# Patient Record
Sex: FEMALE | Race: WHITE | HISPANIC OR LATINO | Employment: FULL TIME | URBAN - METROPOLITAN AREA
[De-identification: names, ages, dates, MRNs, and addresses within clinical notes are randomized per-mention and may not be internally consistent; named-entity substitution may affect disease eponyms.]

---

## 2017-10-26 ENCOUNTER — TRANSFERRED RECORDS (OUTPATIENT)
Dept: MULTI SPECIALTY CLINIC | Facility: CLINIC | Age: 27
End: 2017-10-26

## 2017-10-26 LAB — PAP-ABSTRACT: NORMAL

## 2017-12-03 ENCOUNTER — HEALTH MAINTENANCE LETTER (OUTPATIENT)
Age: 27
End: 2017-12-03

## 2019-09-30 NOTE — PATIENT INSTRUCTIONS
Reminders: If you are signed up for [employer] please be aware your results and communications will be sent within your mychart. Please remember to arrive 5-10 minutes early for your appointments. If you are late you may need to reschedule your appointment.      Good luck with the job interview!  We will let you know your lab results when we get them back and if abnormal, what to be done about it. Schedule your referrals. Follow up in clinic if you have any health care questions or concerns.

## 2019-10-03 ENCOUNTER — OFFICE VISIT (OUTPATIENT)
Dept: FAMILY MEDICINE | Facility: CLINIC | Age: 29
End: 2019-10-03
Payer: OTHER GOVERNMENT

## 2019-10-03 VITALS
SYSTOLIC BLOOD PRESSURE: 133 MMHG | OXYGEN SATURATION: 97 % | HEIGHT: 64 IN | BODY MASS INDEX: 32.13 KG/M2 | WEIGHT: 188.2 LBS | TEMPERATURE: 99 F | HEART RATE: 80 BPM | RESPIRATION RATE: 16 BRPM | DIASTOLIC BLOOD PRESSURE: 98 MMHG

## 2019-10-03 DIAGNOSIS — M54.9 CHRONIC BACK PAIN, UNSPECIFIED BACK LOCATION, UNSPECIFIED BACK PAIN LATERALITY: ICD-10-CM

## 2019-10-03 DIAGNOSIS — R63.5 WEIGHT GAIN: ICD-10-CM

## 2019-10-03 DIAGNOSIS — E66.811 CLASS 1 OBESITY WITHOUT SERIOUS COMORBIDITY WITH BODY MASS INDEX (BMI) OF 30.0 TO 30.9 IN ADULT, UNSPECIFIED OBESITY TYPE: ICD-10-CM

## 2019-10-03 DIAGNOSIS — M25.579 PAIN IN JOINT, ANKLE AND FOOT, UNSPECIFIED LATERALITY: ICD-10-CM

## 2019-10-03 DIAGNOSIS — L65.9 HAIR LOSS: Primary | ICD-10-CM

## 2019-10-03 DIAGNOSIS — M54.2 NECK PAIN: ICD-10-CM

## 2019-10-03 DIAGNOSIS — G89.29 CHRONIC BACK PAIN, UNSPECIFIED BACK LOCATION, UNSPECIFIED BACK PAIN LATERALITY: ICD-10-CM

## 2019-10-03 DIAGNOSIS — F43.23 SITUATIONAL MIXED ANXIETY AND DEPRESSIVE DISORDER: ICD-10-CM

## 2019-10-03 DIAGNOSIS — R21 RASH AND NONSPECIFIC SKIN ERUPTION: ICD-10-CM

## 2019-10-03 DIAGNOSIS — F41.9 ANXIETY: ICD-10-CM

## 2019-10-03 LAB — TSH SERPL DL<=0.005 MIU/L-ACNC: 2.3 MU/L (ref 0.4–4)

## 2019-10-03 PROCEDURE — 36415 COLL VENOUS BLD VENIPUNCTURE: CPT | Performed by: NURSE PRACTITIONER

## 2019-10-03 PROCEDURE — 99204 OFFICE O/P NEW MOD 45 MIN: CPT | Performed by: NURSE PRACTITIONER

## 2019-10-03 PROCEDURE — 84443 ASSAY THYROID STIM HORMONE: CPT | Performed by: NURSE PRACTITIONER

## 2019-10-03 RX ORDER — NYSTATIN AND TRIAMCINOLONE ACETONIDE 100000; 1 [USP'U]/G; MG/G
OINTMENT TOPICAL 2 TIMES DAILY
Qty: 30 G | Refills: 1 | Status: SHIPPED | OUTPATIENT
Start: 2019-10-03 | End: 2020-10-20

## 2019-10-03 RX ORDER — NORGESTIMATE AND ETHINYL ESTRADIOL 7DAYSX3 LO
1 KIT ORAL
COMMUNITY
Start: 2018-09-24 | End: 2020-04-24 | Stop reason: ALTCHOICE

## 2019-10-03 RX ORDER — LORATADINE 10 MG/1
10 TABLET ORAL EVERY 24 HOURS
COMMUNITY
Start: 2016-06-02 | End: 2019-10-03

## 2019-10-03 RX ORDER — ESCITALOPRAM OXALATE 20 MG/1
20 TABLET ORAL
COMMUNITY
Start: 2019-04-20 | End: 2020-03-13

## 2019-10-03 ASSESSMENT — ANXIETY QUESTIONNAIRES
7. FEELING AFRAID AS IF SOMETHING AWFUL MIGHT HAPPEN: SEVERAL DAYS
IF YOU CHECKED OFF ANY PROBLEMS ON THIS QUESTIONNAIRE, HOW DIFFICULT HAVE THESE PROBLEMS MADE IT FOR YOU TO DO YOUR WORK, TAKE CARE OF THINGS AT HOME, OR GET ALONG WITH OTHER PEOPLE: SOMEWHAT DIFFICULT
6. BECOMING EASILY ANNOYED OR IRRITABLE: SEVERAL DAYS
2. NOT BEING ABLE TO STOP OR CONTROL WORRYING: SEVERAL DAYS
3. WORRYING TOO MUCH ABOUT DIFFERENT THINGS: MORE THAN HALF THE DAYS
1. FEELING NERVOUS, ANXIOUS, OR ON EDGE: MORE THAN HALF THE DAYS
GAD7 TOTAL SCORE: 7
5. BEING SO RESTLESS THAT IT IS HARD TO SIT STILL: NOT AT ALL

## 2019-10-03 ASSESSMENT — PATIENT HEALTH QUESTIONNAIRE - PHQ9
SUM OF ALL RESPONSES TO PHQ QUESTIONS 1-9: 5
5. POOR APPETITE OR OVEREATING: NOT AT ALL

## 2019-10-03 ASSESSMENT — MIFFLIN-ST. JEOR: SCORE: 1579.54

## 2019-10-03 NOTE — PROGRESS NOTES
Subjective     Liudmila Collado is a 29 year old female who presents to clinic today for the following health issues:    HPI   1. Referral for counseling- recently lost her dad and dog.   lost his job. He is having gender questioning issues, and is now going to be deployed with the [employer] for 1 year.   2. Referral for physical therapy for back and ankles, neck- chronic pain. Has been seeing chiropractor but feels she may improve better with PT.   3. Referral for dermatology skin rash on chest, antifungal did not help.   4. Would like her thyroid checked-  mentioned to her that she loses a lot of hair, she has anxiety and issues with weight.       Patient Active Problem List   Diagnosis     CARDIOVASCULAR SCREENING; LDL GOAL LESS THAN 160     Encounter for other general counseling or advice on contraception     Class 1 obesity without serious comorbidity with body mass index (BMI) of 30.0 to 30.9 in adult, unspecified obesity type     Neck pain     Chronic back pain, unspecified back location, unspecified back pain laterality     Pain in joint, ankle and foot, unspecified laterality     Situational mixed anxiety and depressive disorder     Anxiety     Weight gain     Past Surgical History:   Procedure Laterality Date     BREAST BIOPSY, CORE RT/LT Right 2013    fibroadenoma     DENTAL SURGERY      wisdom teeth removed       Social History     Tobacco Use     Smoking status: Never Smoker     Smokeless tobacco: Never Used   Substance Use Topics     Alcohol use: Yes     Comment: socially     Family History   Problem Relation Age of Onset     Hypertension Father      Lipids Father      Depression Father      Aneurysm Father         observing     Diabetes Maternal Grandmother      Diabetes Maternal Aunt         x1     Diabetes Maternal Uncle         x2     Breast Cancer Paternal Aunt      Cancer - colorectal No family hx of          Current Outpatient Medications   Medication Sig Dispense Refill      "albuterol (PROAIR HFA, PROVENTIL HFA, VENTOLIN HFA) 108 (90 BASE) MCG/ACT inhaler Inhale 2 puffs into the lungs every 6 hours as needed for shortness of breath / dyspnea or wheezing 1 Inhaler 2     escitalopram (LEXAPRO) 20 MG tablet Take 20 mg by mouth       etonogestrel-ethinyl estradiol (NUVARING) 0.12-0.015 MG/24HR vaginal ring Place 1 ring every 21 days then remove for 1 week. 3 each 3     norgestim-eth estrad triphasic (ORTHO TRI-CYCLEN LO) 0.18/0.215/0.25 MG-25 MCG tablet Take 1 tablet by mouth       nystatin-triamcinolone (MYCOLOG) 382570-8.1 UNIT/GM-% external ointment Apply topically 2 times daily 30 g 1     No Known Allergies  Recent Labs   Lab Test 10/03/19  1048   TSH 2.30      BP Readings from Last 3 Encounters:   10/03/19 (!) 133/98   02/16/15 126/84   01/20/15 102/74    Wt Readings from Last 3 Encounters:   10/03/19 85.4 kg (188 lb 3.2 oz)   02/16/15 68.6 kg (151 lb 3.2 oz)   01/20/15 68.9 kg (152 lb)               Reviewed and updated as needed this visit by Provider  Tobacco  Allergies  Meds  Problems  Med Hx  Surg Hx  Fam Hx         Review of Systems   ROS COMP: Constitutional, HEENT, cardiovascular, pulmonary, GI, , musculoskeletal, neuro, skin, endocrine and psych systems are negative, except as otherwise noted.      Objective    BP (!) 133/98   Pulse 80   Temp 99  F (37.2  C) (Oral)   Resp 16   Ht 1.635 m (5' 4.37\")   Wt 85.4 kg (188 lb 3.2 oz)   LMP 09/26/2019 (Approximate)   SpO2 97%   Breastfeeding? No   BMI 31.93 kg/m    Body mass index is 31.93 kg/m .  Physical Exam   GENERAL: healthy, alert and no distress  NECK: no adenopathy, no asymmetry, masses, or scars and thyroid normal to palpation  RESP: lungs clear to auscultation - no rales, rhonchi or wheezes  CV: regular rate and rhythm, normal S1 S2, no S3 or S4, no murmur, click or rub, no peripheral edema and peripheral pulses strong  MS: no gross musculoskeletal defects noted, no edema  SKIN: POSITIVE for erythematous, " "raised rash with irregular boarders to chest- between breasts.   PSYCH: mentation appears normal, affect normal/bright    Diagnostic Test Results:  Labs reviewed in Epic  See orders        Assessment & Plan       ICD-10-CM    1. Hair loss L65.9 TSH with free T4 reflex   2. Weight gain R63.5 TSH with free T4 reflex   3. Anxiety F41.9 TSH with free T4 reflex   4. Situational mixed anxiety and depressive disorder F43.23 MENTAL HEALTH REFERRAL  - Adult; Outpatient Treatment; Individual/Couples/Family/Group Therapy/Health Psychology; G: [facility] [phone]; We will contact you to schedule the appointment or please call with any questions   5. Pain in joint, ankle and foot, unspecified laterality M25.579 PHYSICAL THERAPY REFERRAL   6. Chronic back pain, unspecified back location, unspecified back pain laterality M54.9 PHYSICAL THERAPY REFERRAL    G89.29    7. Neck pain M54.2 PHYSICAL THERAPY REFERRAL   8. Rash and nonspecific skin eruption R21 DERMATOLOGY REFERRAL     nystatin-triamcinolone (MYCOLOG) 134580-8.1 UNIT/GM-% external ointment   9. Class 1 obesity without serious comorbidity with body mass index (BMI) of 30.0 to 30.9 in adult, unspecified obesity type E66.9     Z68.30         BMI:   Estimated body mass index is 31.93 kg/m  as calculated from the following:    Height as of this encounter: 1.635 m (5' 4.37\").    Weight as of this encounter: 85.4 kg (188 lb 3.2 oz).   Weight management plan: Discussed healthy diet and exercise guidelines        See Patient Instructions: Good luck with the job interview!  We will let you know your lab results when we get them back and if abnormal, what to be done about it. Schedule your referrals. Follow up in clinic if you have any health care questions or concerns.     Return in about 4 weeks (around 10/31/2019), or if symptoms worsen or fail to improve.    Shahla Doty, JUAN  [facility] [place]      "

## 2019-10-04 PROBLEM — F43.23 SITUATIONAL MIXED ANXIETY AND DEPRESSIVE DISORDER: Status: ACTIVE | Noted: 2019-10-04

## 2019-10-04 PROBLEM — F41.9 ANXIETY: Status: ACTIVE | Noted: 2019-10-04

## 2019-10-04 PROBLEM — R63.5 WEIGHT GAIN: Status: ACTIVE | Noted: 2019-10-04

## 2019-10-04 PROBLEM — M25.579 PAIN IN JOINT, ANKLE AND FOOT, UNSPECIFIED LATERALITY: Status: ACTIVE | Noted: 2019-10-04

## 2019-10-04 PROBLEM — M54.2 NECK PAIN: Status: ACTIVE | Noted: 2019-10-04

## 2019-10-04 PROBLEM — E66.811 CLASS 1 OBESITY WITHOUT SERIOUS COMORBIDITY WITH BODY MASS INDEX (BMI) OF 30.0 TO 30.9 IN ADULT, UNSPECIFIED OBESITY TYPE: Status: ACTIVE | Noted: 2019-10-04

## 2019-10-04 PROBLEM — M54.9 CHRONIC BACK PAIN, UNSPECIFIED BACK LOCATION, UNSPECIFIED BACK PAIN LATERALITY: Status: ACTIVE | Noted: 2019-10-04

## 2019-10-04 PROBLEM — G89.29 CHRONIC BACK PAIN, UNSPECIFIED BACK LOCATION, UNSPECIFIED BACK PAIN LATERALITY: Status: ACTIVE | Noted: 2019-10-04

## 2019-10-04 ASSESSMENT — ANXIETY QUESTIONNAIRES: GAD7 TOTAL SCORE: 7

## 2019-10-04 NOTE — RESULT ENCOUNTER NOTE
Darrius Nur,    Thank you for your recent office visit.    Here are your recent results.  Normal thyroid labs.     Feel free to contact me via [employer] or call the clinic at [phone].    Sincerely,    COOPER Chew, FNP-BC

## 2020-03-02 ENCOUNTER — HEALTH MAINTENANCE LETTER (OUTPATIENT)
Age: 30
End: 2020-03-02

## 2020-04-22 ENCOUNTER — MYC MEDICAL ADVICE (OUTPATIENT)
Dept: FAMILY MEDICINE | Facility: CLINIC | Age: 30
End: 2020-04-22

## 2020-04-22 ENCOUNTER — E-VISIT (OUTPATIENT)
Dept: FAMILY MEDICINE | Facility: CLINIC | Age: 30
End: 2020-04-22
Payer: OTHER GOVERNMENT

## 2020-04-22 DIAGNOSIS — N93.9 VAGINAL BLEEDING: Primary | ICD-10-CM

## 2020-04-22 PROCEDURE — 99422 OL DIG E/M SVC 11-20 MIN: CPT | Performed by: NURSE PRACTITIONER

## 2020-04-22 NOTE — TELEPHONE ENCOUNTER
Patient should have a visit to address these concerns. [employer] message sent.    LYUBOV Hall on 4/22/2020 at 2:16 PM

## 2020-04-23 ENCOUNTER — MYC MEDICAL ADVICE (OUTPATIENT)
Dept: FAMILY MEDICINE | Facility: CLINIC | Age: 30
End: 2020-04-23

## 2020-04-23 DIAGNOSIS — N93.9 VAGINAL BLEEDING: Primary | ICD-10-CM

## 2020-04-23 NOTE — TELEPHONE ENCOUNTER
Provider E-Visit time total (minutes): unsure due to 2 providers addressing issue. Writer spent 10 min. COOPER Chew, FNP-BC

## 2020-04-23 NOTE — TELEPHONE ENCOUNTER
Unclear cause of vaginal bleeding. Will need CBC and pregnancy test. No vaginal discharge complaints, so unlikely STI, yeast, BV or TOA/PID. Anticipate OCP change for dysfunctional uterine bleeding if labs reassuring.    Please schedule lab appointment with patient for blood draw.    LYUBOV Hall on 4/23/2020 at 11:44 AM      Provider E-Visit time total (minutes): 11    Mychart E-Visit Other 1     Question  4/22/2020  4:04 PM CDT - Filed by Patient    Please describe your symptoms.  I started my period on April 7th. Since then it has continued and is still going strong. We are now on day 15. My periods normally last 4-5 days and are light to moderate. This has been heavy, and has been constant the whole time and has shown no signs of ending anytime soon. I haven't had many other symptoms no real cramping. I had body aches the day before it started but haven't had any real aches since then. I've become more fatigued, but this may be due to a new work schedule at my job (earlier days).    Have you had these symptoms before?  No    How long have you been having these symptoms? (Just today, For a few days, For a week, For one to four weeks, For more than a month)  For one to four weeks    Please list any medications you are currently taking for this condition.  I'm continuing my anxiety medication and have been taking birth control pills (although I do need a refill)    Please describe any probable cause for these symptoms.  not sure. This has never happened before that I know of    Wrap up     Anything else you would like to add?     Are you pregnant or breastfeeding?  I am confident that I am neither

## 2020-04-23 NOTE — PATIENT INSTRUCTIONS
Chrissy Nur,    Thank you for allowing [facility] to manage your care.    At this point I am unsure of the cause of your excessive bleeding. We should do some blood work to make sure you are not pregnant or anemic. We will contact you to schedule a lab visit at one of our clinic locations for a blood draw. After we get that information, we can likely prescribe you a stronger birth control pill to stop the bleeding and reset your cycle. I will reach out to you by [employer] to discuss the results.    Go to urgent care or the ER at any time if you develop bleeding in the amount of 2 pads/tampons per hour, if you get dizziness/faint, shortness of breath or other worsening/changing symptoms.    Please allow 1-2 business days for our office to call you in regards to your laboratory/radiological studies.  If not done so, I encourage you to login into [employer] ([email or web address] to review your results as well.     If you have any questions or concerns, please feel free to call us at [phone]    Sincerely,    Wes Chapman PA-C    Did you know?  You can schedule an e-Visit for certain simple non-emergent issue for your convenience.  To learn more about or start an eVisit, simply login to [employer], click  Visits  on top banner, click  Start a Virtual Visit  drop down, and click  Symptom-Specific E-Visit       Patient Education     Dysfunctional Uterine Bleeding    Dysfunctional uterine bleeding, also called abnormal uterine bleeding, is a condition in which bleeding is abnormal and occurs at unexpected times of the month. This happens because of changes in the hormones that help control a woman s menstrual cycle each month.  The bleeding may be heavier or lighter than normal. If you have heavy bleeding often, this can lead to a problem called anemia. With anemia, your red blood cell count is too low. Red blood cells help carry oxygen throughout your body. Severe anemia may cause you to look  pale and feel very weak or tired. You might also become short of breath easily.  To treat dysfunctional uterine bleeding, medicines are often tried first. If these don t help, or if you have additional symptoms or have reached menopause, further testing and treatments may be needed. Discuss all of your options with your provider.  Home care  Medicines  If you re prescribed medicines, be sure to take them as directed. Some of the more common medicines you may be prescribed include:    Hormone therapy (Options include most methods of hormonal birth control such as pills, shots, or a hormone-releasing IUD)    Nonsteroidal anti-inflammatory drugs (NSAIDs), such as ibuprofen    Iron supplements, if you have anemia     General care    Get plenty of rest if you tire easily. Avoid heavy exertion.    To help relieve pain or cramping that may occur with bleeding, try using a heating pad on the lower belly or back. A warm bath may also help.  Follow-up care  Follow up with your healthcare provider, or as directed.  When to seek medical advice  Call your healthcare provider right away if:    Bleeding becomes heavy (soaking 1 pad or tampon every hour for 3 hours)    Increased abdominal pain    Irregular bleeding worsens or does not get better even with treatment    Fever of 100.4 F (38 C) or higher, or as directed by your provider    Signs of anemia, such as pale skin, extreme fatigue or weakness, or shortness of breath    Dizziness or fainting   Date Last Reviewed: 11/1/2017 2000-2019 [employer]. [address] [place] [address], [place] [address]. All rights reserved. This information is not intended as a substitute for professional medical care. Always follow your healthcare professional's instructions.

## 2020-04-24 DIAGNOSIS — N93.9 VAGINAL BLEEDING: ICD-10-CM

## 2020-04-24 LAB
ERYTHROCYTE [DISTWIDTH] IN BLOOD BY AUTOMATED COUNT: 13.7 % (ref 10–15)
HCG SERPL QL: NEGATIVE
HCT VFR BLD AUTO: 45.1 % (ref 35–47)
HGB BLD-MCNC: 14.5 G/DL (ref 11.7–15.7)
MCH RBC QN AUTO: 29.9 PG (ref 26.5–33)
MCHC RBC AUTO-ENTMCNC: 32.2 G/DL (ref 31.5–36.5)
MCV RBC AUTO: 93 FL (ref 78–100)
PLATELET # BLD AUTO: 298 10E9/L (ref 150–450)
RBC # BLD AUTO: 4.85 10E12/L (ref 3.8–5.2)
WBC # BLD AUTO: 9.7 10E9/L (ref 4–11)

## 2020-04-24 PROCEDURE — 84703 CHORIONIC GONADOTROPIN ASSAY: CPT | Performed by: PHYSICIAN ASSISTANT

## 2020-04-24 PROCEDURE — 36415 COLL VENOUS BLD VENIPUNCTURE: CPT | Performed by: PHYSICIAN ASSISTANT

## 2020-04-24 PROCEDURE — 85027 COMPLETE CBC AUTOMATED: CPT | Performed by: PHYSICIAN ASSISTANT

## 2020-04-24 RX ORDER — NORGESTIMATE AND ETHINYL ESTRADIOL 0.25-0.035
KIT ORAL
Qty: 56 TABLET | Refills: 0 | Status: SHIPPED | OUTPATIENT
Start: 2020-04-24 | End: 2020-05-28

## 2020-04-24 NOTE — TELEPHONE ENCOUNTER
Rx for Ortho Cyclen burst for dysfunctional uterine bleeding sent to [facility].    LYUBOV Hall on 4/24/2020 at 2:46 PM

## 2020-05-03 ENCOUNTER — MYC MEDICAL ADVICE (OUTPATIENT)
Dept: FAMILY MEDICINE | Facility: CLINIC | Age: 30
End: 2020-05-03

## 2020-05-03 DIAGNOSIS — N93.9 ABNORMAL UTERINE BLEEDING: Primary | ICD-10-CM

## 2020-05-04 ENCOUNTER — MYC MEDICAL ADVICE (OUTPATIENT)
Dept: FAMILY MEDICINE | Facility: CLINIC | Age: 30
End: 2020-05-04

## 2020-05-06 ENCOUNTER — MYC MEDICAL ADVICE (OUTPATIENT)
Dept: FAMILY MEDICINE | Facility: CLINIC | Age: 30
End: 2020-05-06

## 2020-05-08 ENCOUNTER — ANCILLARY PROCEDURE (OUTPATIENT)
Dept: ULTRASOUND IMAGING | Facility: CLINIC | Age: 30
End: 2020-05-08
Attending: NURSE PRACTITIONER
Payer: OTHER GOVERNMENT

## 2020-05-08 DIAGNOSIS — N93.9 ABNORMAL UTERINE BLEEDING: ICD-10-CM

## 2020-05-08 PROCEDURE — 76830 TRANSVAGINAL US NON-OB: CPT | Performed by: RADIOLOGY

## 2020-05-08 PROCEDURE — 76856 US EXAM PELVIC COMPLETE: CPT | Mod: 59 | Performed by: RADIOLOGY

## 2020-05-08 NOTE — RESULT ENCOUNTER NOTE
Darrius Nur,    Thank you for your recent office visit.    Here are your recent results.  Your pelvic ultrasound is normal.  I would continue the birth control and see if the abnormal bleeding stops over the course of the next few months. Follow up if needed.    Feel free to contact me via [employer] or call the clinic at [phone].    Sincerely,    Shahal Doty, COOPER, FNP-BC

## 2020-05-27 DIAGNOSIS — N93.9 VAGINAL BLEEDING: ICD-10-CM

## 2020-05-27 DIAGNOSIS — F43.23 ADJUSTMENT DISORDER WITH MIXED ANXIETY AND DEPRESSED MOOD: ICD-10-CM

## 2020-05-28 RX ORDER — NORGESTIMATE AND ETHINYL ESTRADIOL 0.25-0.035
KIT ORAL
Qty: 56 TABLET | Refills: 0 | Status: SHIPPED | OUTPATIENT
Start: 2020-05-28 | End: 2020-08-20

## 2020-06-18 ENCOUNTER — MYC MEDICAL ADVICE (OUTPATIENT)
Dept: FAMILY MEDICINE | Facility: CLINIC | Age: 30
End: 2020-06-18
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
## 2020-08-24 ENCOUNTER — OFFICE VISIT (OUTPATIENT)
Dept: FAMILY MEDICINE | Facility: CLINIC | Age: 30
End: 2020-08-24
Payer: OTHER GOVERNMENT

## 2020-08-24 VITALS
HEIGHT: 64 IN | BODY MASS INDEX: 32.91 KG/M2 | TEMPERATURE: 98.2 F | WEIGHT: 192.8 LBS | RESPIRATION RATE: 16 BRPM | SYSTOLIC BLOOD PRESSURE: 124 MMHG | HEART RATE: 72 BPM | OXYGEN SATURATION: 97 % | DIASTOLIC BLOOD PRESSURE: 88 MMHG

## 2020-08-24 DIAGNOSIS — Z11.4 SCREENING FOR HUMAN IMMUNODEFICIENCY VIRUS WITHOUT PRESENCE OF RISK FACTORS: ICD-10-CM

## 2020-08-24 DIAGNOSIS — K52.29 GASTROINTESTINAL FOOD ALLERGY: Primary | ICD-10-CM

## 2020-08-24 DIAGNOSIS — N93.9 VAGINAL BLEEDING: ICD-10-CM

## 2020-08-24 DIAGNOSIS — F43.23 ADJUSTMENT DISORDER WITH MIXED ANXIETY AND DEPRESSED MOOD: ICD-10-CM

## 2020-08-24 DIAGNOSIS — Z30.09 BIRTH CONTROL COUNSELING: ICD-10-CM

## 2020-08-24 PROCEDURE — 36415 COLL VENOUS BLD VENIPUNCTURE: CPT | Performed by: NURSE PRACTITIONER

## 2020-08-24 PROCEDURE — 82785 ASSAY OF IGE: CPT | Performed by: NURSE PRACTITIONER

## 2020-08-24 PROCEDURE — 99214 OFFICE O/P EST MOD 30 MIN: CPT | Performed by: NURSE PRACTITIONER

## 2020-08-24 PROCEDURE — 87389 HIV-1 AG W/HIV-1&-2 AB AG IA: CPT | Performed by: NURSE PRACTITIONER

## 2020-08-24 PROCEDURE — 86003 ALLG SPEC IGE CRUDE XTRC EA: CPT | Performed by: NURSE PRACTITIONER

## 2020-08-24 RX ORDER — ESCITALOPRAM OXALATE 20 MG/1
20 TABLET ORAL DAILY
Qty: 90 TABLET | Refills: 1 | Status: SHIPPED | OUTPATIENT
Start: 2020-08-24 | End: 2021-04-13

## 2020-08-24 RX ORDER — NORGESTIMATE AND ETHINYL ESTRADIOL 0.25-0.035
KIT ORAL
Qty: 56 TABLET | Refills: 0 | Status: CANCELLED | OUTPATIENT
Start: 2020-08-24

## 2020-08-24 RX ORDER — NORGESTIMATE AND ETHINYL ESTRADIOL 7DAYSX3 LO
1 KIT ORAL DAILY
Qty: 90 TABLET | Refills: 3 | Status: SHIPPED | OUTPATIENT
Start: 2020-08-24 | End: 2023-06-05

## 2020-08-24 ASSESSMENT — ANXIETY QUESTIONNAIRES
2. NOT BEING ABLE TO STOP OR CONTROL WORRYING: SEVERAL DAYS
5. BEING SO RESTLESS THAT IT IS HARD TO SIT STILL: NOT AT ALL
7. FEELING AFRAID AS IF SOMETHING AWFUL MIGHT HAPPEN: SEVERAL DAYS
6. BECOMING EASILY ANNOYED OR IRRITABLE: NOT AT ALL
1. FEELING NERVOUS, ANXIOUS, OR ON EDGE: NOT AT ALL
3. WORRYING TOO MUCH ABOUT DIFFERENT THINGS: SEVERAL DAYS
IF YOU CHECKED OFF ANY PROBLEMS ON THIS QUESTIONNAIRE, HOW DIFFICULT HAVE THESE PROBLEMS MADE IT FOR YOU TO DO YOUR WORK, TAKE CARE OF THINGS AT HOME, OR GET ALONG WITH OTHER PEOPLE: SOMEWHAT DIFFICULT
GAD7 TOTAL SCORE: 4

## 2020-08-24 ASSESSMENT — PATIENT HEALTH QUESTIONNAIRE - PHQ9
5. POOR APPETITE OR OVEREATING: SEVERAL DAYS
SUM OF ALL RESPONSES TO PHQ QUESTIONS 1-9: 6

## 2020-08-24 ASSESSMENT — MIFFLIN-ST. JEOR: SCORE: 1592.29

## 2020-08-25 LAB — HIV 1+2 AB+HIV1 P24 AG SERPL QL IA: NONREACTIVE

## 2020-08-25 ASSESSMENT — ANXIETY QUESTIONNAIRES: GAD7 TOTAL SCORE: 4

## 2020-08-26 ENCOUNTER — VIRTUAL VISIT (OUTPATIENT)
Dept: DERMATOLOGY | Facility: CLINIC | Age: 30
End: 2020-08-26
Payer: OTHER GOVERNMENT

## 2020-08-26 DIAGNOSIS — L83 CONFLUENT AND RETICULATE PAPILLOMATOSIS: Primary | ICD-10-CM

## 2020-08-26 LAB
ALMOND IGE QN: <0.1 KU(A)/L
CASHEW NUT IGE QN: <0.1 KU(A)/L
CODFISH IGE QN: <0.1 KU(A)/L
COW MILK IGE QN: 0.26 KU(A)/L
EGG WHITE IGE QN: 0.13 KU(A)/L
HAZELNUT IGE QN: 1.65 KU(A)/L
IGE SERPL-ACNC: 245 KIU/L (ref 0–114)
PEANUT IGE QN: <0.1 KU(A)/L
SALMON IGE QN: <0.1 KU(A)/L
SCALLOP IGE QN: 0.1 KU(A)/L
SESAME SEED IGE QN: <0.1 KU(A)/L
SHRIMP IGE QN: 0.21 KU(A)/L
SOYBEAN IGE QN: <0.1 KU(A)/L
TUNA IGE QN: <0.1 KU(A)/L
WALNUT IGE QN: <0.1 KU(A)/L
WHEAT IGE QN: <0.1 KU(A)/L

## 2020-08-26 PROCEDURE — 99441 ZZC PHYSICIAN TELEPHONE EVALUATION 5-10 MIN: CPT | Mod: 95 | Performed by: DERMATOLOGY

## 2020-08-26 RX ORDER — MINOCYCLINE HYDROCHLORIDE 100 MG/1
100 CAPSULE ORAL 2 TIMES DAILY
Qty: 84 CAPSULE | Refills: 0 | Status: SHIPPED | OUTPATIENT
Start: 2020-08-26 | End: 2021-05-10

## 2020-08-26 NOTE — PATIENT INSTRUCTIONS
[facility] Dermatology Visit    Thank you for allowing us to participate in your care. Your findings, instructions and follow-up plan are as follows:    Diagnosis: Confluent And Reticulated Papillomatosis (CARP)  Plan:  -Minocycline 100mg twice a day until resolved or up to 6 weeks  -We don't know exactly what causes this rash, but it does tend to only affect younger people  -Some people have one episode, some people (like you!) get recurrent episodes  -It does typically respond very well to minocycline, but may come back after we stop it  -Reach out if not resolved after 6 weeks or if it comes right back after stopping the  Minocycline  -See minocycline handout below for side effect details          When should I call my doctor?    If you are worsening or not improving, please, contact us or seek urgent care as noted below.     Who should I call with questions (adults)?    [facility] (adult and pediatric): [phone]     [facility] (adult): [phone]    For urgent needs outside of business hours call the [facility] at [phone] and ask for the dermatology resident on call    If this is a medical emergency and you are unable to reach an ER, Call [phone]      Who should I call with questions (pediatric)?  [facility]- Pediatric Dermatology  Dr. Rosmery Perez, Dr. Sarthak Nolasco, Dr. Bailey Bear, Ofelia Leonard, LYUBOV Kaur, Dr. Ainsley Freed & Dr. Ed Hernandez  Non Urgent  Nurse Triage Line; [phone]- Julissa and Lyla CAMPBELL Care Coordinators   Colleen (/Complex ) [phone]    If you need a prescription refill, please contact your pharmacy. Refills are approved or denied by our Physicians during normal business hours, Monday through Fridays  Per office policy, refills will not be granted if you have not been seen within the past year (or sooner  depending on your child's condition)    Scheduling Information:  Pediatric Appointment Scheduling and Call Center [phone]  Radiology Scheduling- [phone]  Sedation Unit Scheduling- [phone]  [place] Scheduling- General [phone]; Pediatric Dermatology [phone]  Main  Services: [phone]  Nauruan: [phone]  Cook Islander: [phone]  Hmong/Jamaican/Omari: [phone]  Preadmission Nursing Department Fax Number: [phone] (Fax all pre-operative paperwork to this number)    For urgent matters arising during evenings, weekends, or holidays that cannot wait for normal business hours please call [phone] and ask for the Dermatology Resident On-Call to be paged.

## 2020-08-26 NOTE — PROGRESS NOTES
"Teledermatology Nurse Call for NEW Patients (not seen in last 3 years):     The patient was contacted by phone and we reviewed they have a visit in teledermatology upcoming with an MD or LIANE;  Importantly, \"a teledermatology visit may not be as thorough as an in-person visit, and the quality of the photograph and/or video sent may not be of the same quality as that taken by the dermatology clinic.\"     This video visit will be conducted via a call between you and your physician/provider via Geron. We have found that certain health care needs can be provided without the need for an in-person physical exam.  This service lets us provide the care you need with a video conversation.  If a prescription is necessary we can send it directly to your pharmacy.  If lab work is needed we can place an order for that and you can then stop by our lab to have the test done at a later time.If during the course of the call the physician/provider feels a video visit is not appropriate, you will not be charged for this service.Visits are billed at different rates depending on your insurance coverage. Please reach out to your insurance provider with any questions.    The patient will also send photographs via Oceen for review. Instructions for photography are/were sent to the patient via Oceen messaging.       The patient verified the location of the photo/video visit to be [place].(The physician must be notified by nurse if the patient is not in the state of [place] during the encounter)    The patient denied skin pain, fever, mucosal symptoms(lesions, blisters, sores in the mouth, nose, eyes, or genitals) no IF PATIENT ENDORSES ANY OF THESE STOP AND PAGE ON CALL ATTENDING    Additional notes:  Patient summary of issue:rash/discolaration  Location of problem on body:between breasts  How long has area/symptoms been present:had off an on for years  What makes it better?:no  What makes it worse?:no   Other symptoms include the " following:dry, not itchy, area flush with the skin and not raised  Which medications have been tried, for how long, and did they make it better or worse (ex. Triamcinolone, used twice daily for 2 weeks, not improved):Has tried antifungals, no longer working. Was prescribed nystatin-triamcinolone and did not see improvement.  The patient has not seen a dermatologist.   The patient hasno past medical history of skin cancer  ROS: The patient is generally feeling well.     Aaliyah García LPN      [facility] Dermatology Record:  Store and Forward and Telephone [phone]      Dermatology Problem List:  1. CARP, central chest  -Minocycline 100mg BID until resolved or up to 6 weeks    Encounter Date: Aug 26, 2020    CC:   Chief Complaint   Patient presents with     Derm Problem     Rash/discoloration between breasts       History of Present Illness:  Liudmila Collado is a 30 year old female who presents for rash.    In the past, antifungal creams have sporadically helped. She wonders if the rash would have went away with no treatment at all or if the antifungals really helped. Most recently tried nystatin-triamcinolone cream which did not seem to help (rx sent 10/2019). She has had similar rash on the back before, not currently.    No other skin concerns today.      ROS: Patient is generally feeling well today     Physical Examination:  General: Well-appearing female, appropriately-developed individual.  Skin: Focused examination including chest, abdomen was performed.   -Reticulated pink-brown papules on the central chest    Labs:  None    Past Medical History:   Patient Active Problem List   Diagnosis     CARDIOVASCULAR SCREENING; LDL GOAL LESS THAN 160     Encounter for other general counseling or advice on contraception     Class 1 obesity without serious comorbidity with body mass index (BMI) of 30.0 to 30.9 in adult, unspecified obesity type     Neck pain     Chronic back pain, unspecified back location,  unspecified back pain laterality     Pain in joint, ankle and foot, unspecified laterality     Situational mixed anxiety and depressive disorder     Anxiety     Weight gain     Past Medical History:   Diagnosis Date     Fibroadenoma      Seasonal rhinitis      Past Surgical History:   Procedure Laterality Date     BREAST BIOPSY, CORE RT/LT Right 2013    fibroadenoma     DENTAL SURGERY      wisdom teeth removed       Social History:  Patient reports that she has never smoked. She has never used smokeless tobacco. She reports current alcohol use. She reports that she does not use drugs. No plans for pregnancy in the near future. .    Family History:  Family History   Problem Relation Age of Onset     Anxiety Disorder Mother      Mental Illness Mother      Asthma Mother      Obesity Mother      Hypertension Father      Lipids Father      Depression Father      Aneurysm Father         observing     Anxiety Disorder Father      Obesity Father      Diabetes Maternal Grandmother      Diabetes Maternal Aunt         x1     Diabetes Maternal Uncle         x2     Breast Cancer Paternal Aunt      Depression Sister      Anxiety Disorder Sister      Asthma Sister      Cancer - colorectal No family hx of        Medications:  Current Outpatient Medications   Medication     albuterol (PROAIR HFA, PROVENTIL HFA, VENTOLIN HFA) 108 (90 BASE) MCG/ACT inhaler     escitalopram (LEXAPRO) 20 MG tablet     norgestim-eth estrad triphasic (ORTHO TRI-CYCLEN LO) 0.18/0.215/0.25 MG-25 MCG tablet     nystatin-triamcinolone (MYCOLOG) 312572-3.1 UNIT/GM-% external ointment     No current facility-administered medications for this visit.           Allergies   Allergen Reactions     Seasonal Allergies            Impression and Recommendations (Patient Counseled on the Following):  1. 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 Some  get this one time and it resolves, others have a course with more times of relapse. It does tend to go away with older age. It is typically very responsive to oral minocycline. Reviewed side effects in detail including tetragenic effects. Patient noted understanding. Will have her take 100mg BID until resolve or up to 6 weeks. RTC if not better in 6 weeks as this would be atypical. Discussed that it may flare on discontinuation of minocycline. She noted understanding.       Follow-up:   PRN     Staff only    Janet Donald MD    Department of Dermatology  [facility]: Phone: [phone], Fax:[phone]  [facility]: Phone: [phone], Fax: [phone]    _____________________________________________________________________________    Teledermatology information:  - Location of patient: [place]  - Patient presented as: provider referral  - Location of teledermatologist:  ([facility] )  - Reason teledermatology is appropriate:  of National Emergency Regarding Coronavirus disease (COVID 19) Outbreak  - Image quality and interpretability: acceptable  - Physician has received verbal consent for a Video/Photos Visit from the patient? Yes  - In-person dermatology visit recommendation: no  - Date of images: 8/26/20  - Service start time:10:58  - Service end time:11:05  - Date of report: 8/26/2020

## 2020-08-26 NOTE — LETTER
"    8/26/2020         RE: Liudmila Collado  [address]  [facility] [address]        Dear Colleague,    Thank you for referring your patient, Liudmila Collado, to the [facility] [place]. Please see a copy of my visit note below.    Teledermatology Nurse Call for NEW Patients (not seen in last 3 years):     The patient was contacted by phone and we reviewed they have a visit in teledermatology upcoming with an MD or LIANE;  Importantly, \"a teledermatology visit may not be as thorough as an in-person visit, and the quality of the photograph and/or video sent may not be of the same quality as that taken by the dermatology clinic.\"     This video visit will be conducted via a call between you and your physician/provider via [employer]. We have found that certain health care needs can be provided without the need for an in-person physical exam.  This service lets us provide the care you need with a video conversation.  If a prescription is necessary we can send it directly to your pharmacy.  If lab work is needed we can place an order for that and you can then stop by our lab to have the test done at a later time.If during the course of the call the physician/provider feels a video visit is not appropriate, you will not be charged for this service.Visits are billed at different rates depending on your insurance coverage. Please reach out to your insurance provider with any questions.    The patient will also send photographs via Acrinta for review. Instructions for photography are/were sent to the patient via Acrinta messaging.       The patient verified the location of the photo/video visit to be [place].(The physician must be notified by nurse if the patient is not in the state of [place] during the encounter)    The patient denied skin pain, fever, mucosal symptoms(lesions, blisters, sores in the mouth, nose, eyes, or genitals) no IF PATIENT ENDORSES ANY OF THESE STOP AND PAGE ON CALL ATTENDING    Additional " notes:  Patient summary of issue:rash/discolaration  Location of problem on body:between breasts  How long has area/symptoms been present:had off an on for years  What makes it better?:no  What makes it worse?:no   Other symptoms include the following:dry, not itchy, area flush with the skin and not raised  Which medications have been tried, for how long, and did they make it better or worse (ex. Triamcinolone, used twice daily for 2 weeks, not improved):Has tried antifungals, no longer working. Was prescribed nystatin-triamcinolone and did not see improvement.  The patient has not seen a dermatologist.   The patient hasno past medical history of skin cancer  ROS: The patient is generally feeling well.     Aaliyah García LPN      [facility] Dermatology Record:  Store and Forward and Telephone [phone]      Dermatology Problem List:  1. CARP, central chest  -Minocycline 100mg BID until resolved or up to 6 weeks    Encounter Date: Aug 26, 2020    CC:   Chief Complaint   Patient presents with     Derm Problem     Rash/discoloration between breasts       History of Present Illness:  Liudmila Collado is a 30 year old female who presents for rash.    In the past, antifungal creams have sporadically helped. She wonders if the rash would have went away with no treatment at all or if the antifungals really helped. Most recently tried nystatin-triamcinolone cream which did not seem to help (rx sent 10/2019). She has had similar rash on the back before, not currently.    No other skin concerns today.      ROS: Patient is generally feeling well today     Physical Examination:  General: Well-appearing female, appropriately-developed individual.  Skin: Focused examination including chest, abdomen was performed.   -Reticulated pink-brown papules on the central chest    Labs:  None    Past Medical History:   Patient Active Problem List   Diagnosis     CARDIOVASCULAR SCREENING; LDL GOAL LESS THAN 160     Encounter for other  general counseling or advice on contraception     Class 1 obesity without serious comorbidity with body mass index (BMI) of 30.0 to 30.9 in adult, unspecified obesity type     Neck pain     Chronic back pain, unspecified back location, unspecified back pain laterality     Pain in joint, ankle and foot, unspecified laterality     Situational mixed anxiety and depressive disorder     Anxiety     Weight gain     Past Medical History:   Diagnosis Date     Fibroadenoma      Seasonal rhinitis      Past Surgical History:   Procedure Laterality Date     BREAST BIOPSY, CORE RT/LT Right 2013    fibroadenoma     DENTAL SURGERY      wisdom teeth removed       Social History:  Patient reports that she has never smoked. She has never used smokeless tobacco. She reports current alcohol use. She reports that she does not use drugs. No plans for pregnancy in the near future. .    Family History:  Family History   Problem Relation Age of Onset     Anxiety Disorder Mother      Mental Illness Mother      Asthma Mother      Obesity Mother      Hypertension Father      Lipids Father      Depression Father      Aneurysm Father         observing     Anxiety Disorder Father      Obesity Father      Diabetes Maternal Grandmother      Diabetes Maternal Aunt         x1     Diabetes Maternal Uncle         x2     Breast Cancer Paternal Aunt      Depression Sister      Anxiety Disorder Sister      Asthma Sister      Cancer - colorectal No family hx of        Medications:  Current Outpatient Medications   Medication     albuterol (PROAIR HFA, PROVENTIL HFA, VENTOLIN HFA) 108 (90 BASE) MCG/ACT inhaler     escitalopram (LEXAPRO) 20 MG tablet     norgestim-eth estrad triphasic (ORTHO TRI-CYCLEN LO) 0.18/0.215/0.25 MG-25 MCG tablet     nystatin-triamcinolone (MYCOLOG) 137239-3.1 UNIT/GM-% external ointment     No current facility-administered medications for this visit.           Allergies   Allergen Reactions     Seasonal Allergies             Impression and Recommendations (Patient Counseled on the Following):  1. 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 Some get this one time and it resolves, others have a course with more times of relapse. It does tend to go away with older age. It is typically very responsive to oral minocycline. Reviewed side effects in detail including tetragenic effects. Patient noted understanding. Will have her take 100mg BID until resolve or up to 6 weeks. RTC if not better in 6 weeks as this would be atypical. Discussed that it may flare on discontinuation of minocycline. She noted understanding.       Follow-up:   PRN     Staff only    Janet Donald MD    Department of Dermatology  [facility]: Phone: [phone], Fax:[phone]  [facility]: Phone: [phone], Fax: [phone]    _____________________________________________________________________________    Teledermatology information:  - Location of patient: [place]  - Patient presented as: provider referral  - Location of teledermatologist:  ([facility] )  - Reason teledermatology is appropriate:  of National Emergency Regarding Coronavirus disease (COVID 19) Outbreak  - Image quality and interpretability: acceptable  - Physician has received verbal consent for a Video/Photos Visit from the patient? Yes  - In-person dermatology visit recommendation: no  - Date of images: 8/26/20  - Service start time:10:58  - Service end time:11:05  - Date of report: 8/26/2020     Again, thank you for allowing me to participate in the care of your patient.        Sincerely,        Janet Donald MD

## 2020-08-27 ENCOUNTER — MYC MEDICAL ADVICE (OUTPATIENT)
Dept: FAMILY MEDICINE | Facility: CLINIC | Age: 30
End: 2020-08-27

## 2020-08-31 NOTE — RESULT ENCOUNTER NOTE
Darrius Nur,    Thank you for your recent office visit.    Here are your recent results.  Labs show food sensitivities/ allergies to egg white, hazelnut, milk, scallops, shrimp    Feel free to contact me via "[employer] or call the clinic at [phone].    Sincerely,    COOPER Chew, FNP-BC

## 2020-10-19 ASSESSMENT — ENCOUNTER SYMPTOMS
BREAST MASS: 0
MYALGIAS: 0
HEARTBURN: 0
FEVER: 0
NAUSEA: 1
PARESTHESIAS: 0
HEADACHES: 1
FREQUENCY: 0
NERVOUS/ANXIOUS: 1
HEMATOCHEZIA: 0
ARTHRALGIAS: 0
WEAKNESS: 0
SHORTNESS OF BREATH: 0
HEMATURIA: 0
CONSTIPATION: 0
ABDOMINAL PAIN: 0
DIARRHEA: 0
SORE THROAT: 0
CHILLS: 0
DIZZINESS: 0
COUGH: 0
EYE PAIN: 0
PALPITATIONS: 0
DYSURIA: 0
JOINT SWELLING: 0

## 2020-10-20 ENCOUNTER — OFFICE VISIT (OUTPATIENT)
Dept: FAMILY MEDICINE | Facility: CLINIC | Age: 30
End: 2020-10-20
Payer: OTHER GOVERNMENT

## 2020-10-20 VITALS
WEIGHT: 194 LBS | HEIGHT: 65 IN | SYSTOLIC BLOOD PRESSURE: 143 MMHG | TEMPERATURE: 99.2 F | OXYGEN SATURATION: 96 % | HEART RATE: 128 BPM | RESPIRATION RATE: 16 BRPM | DIASTOLIC BLOOD PRESSURE: 86 MMHG | BODY MASS INDEX: 32.32 KG/M2

## 2020-10-20 DIAGNOSIS — Z13.1 SCREENING FOR DIABETES MELLITUS: ICD-10-CM

## 2020-10-20 DIAGNOSIS — Z13.220 LIPID SCREENING: ICD-10-CM

## 2020-10-20 DIAGNOSIS — R11.0 NAUSEA: ICD-10-CM

## 2020-10-20 DIAGNOSIS — Z00.00 ROUTINE GENERAL MEDICAL EXAMINATION AT A HEALTH CARE FACILITY: Primary | ICD-10-CM

## 2020-10-20 DIAGNOSIS — S93.402S SPRAIN OF LEFT ANKLE, UNSPECIFIED LIGAMENT, SEQUELA: ICD-10-CM

## 2020-10-20 DIAGNOSIS — Z13.29 SCREENING FOR THYROID DISORDER: ICD-10-CM

## 2020-10-20 DIAGNOSIS — Z23 NEEDS FLU SHOT: ICD-10-CM

## 2020-10-20 DIAGNOSIS — Z12.4 SCREENING FOR MALIGNANT NEOPLASM OF CERVIX: ICD-10-CM

## 2020-10-20 LAB
CHOLEST SERPL-MCNC: 269 MG/DL
GLUCOSE SERPL-MCNC: 96 MG/DL (ref 70–99)
HDLC SERPL-MCNC: 55 MG/DL
LDLC SERPL CALC-MCNC: 154 MG/DL
NONHDLC SERPL-MCNC: 214 MG/DL
TRIGL SERPL-MCNC: 300 MG/DL
TSH SERPL DL<=0.005 MIU/L-ACNC: 0.48 MU/L (ref 0.4–4)

## 2020-10-20 PROCEDURE — 80061 LIPID PANEL: CPT | Performed by: NURSE PRACTITIONER

## 2020-10-20 PROCEDURE — 82947 ASSAY GLUCOSE BLOOD QUANT: CPT | Performed by: NURSE PRACTITIONER

## 2020-10-20 PROCEDURE — 90682 RIV4 VACC RECOMBINANT DNA IM: CPT | Performed by: NURSE PRACTITIONER

## 2020-10-20 PROCEDURE — 99213 OFFICE O/P EST LOW 20 MIN: CPT | Mod: 25 | Performed by: NURSE PRACTITIONER

## 2020-10-20 PROCEDURE — 90471 IMMUNIZATION ADMIN: CPT | Performed by: NURSE PRACTITIONER

## 2020-10-20 PROCEDURE — 99395 PREV VISIT EST AGE 18-39: CPT | Mod: 25 | Performed by: NURSE PRACTITIONER

## 2020-10-20 PROCEDURE — G0145 SCR C/V CYTO,THINLAYER,RESCR: HCPCS | Performed by: NURSE PRACTITIONER

## 2020-10-20 PROCEDURE — 36415 COLL VENOUS BLD VENIPUNCTURE: CPT | Performed by: NURSE PRACTITIONER

## 2020-10-20 PROCEDURE — 84443 ASSAY THYROID STIM HORMONE: CPT | Performed by: NURSE PRACTITIONER

## 2020-10-20 RX ORDER — ONDANSETRON 4 MG/1
4-8 TABLET, FILM COATED ORAL EVERY 8 HOURS PRN
Qty: 12 TABLET | Refills: 0 | Status: SHIPPED | OUTPATIENT
Start: 2020-10-20 | End: 2021-05-10

## 2020-10-20 ASSESSMENT — MIFFLIN-ST. JEOR: SCORE: 1603.98

## 2020-10-20 NOTE — PROGRESS NOTES
"   SUBJECTIVE:   CC: Liudmila Collado is an 30 year old woman who presents for preventive health visit.     {Split Bill scripting  The purpose of this visit is to discuss your medical history and prevent health problems before you are sick. You may be responsible for a co-pay, coinsurance, or deductible if your visit today includes services such as checking on a sore throat, having an x-ray or lab test, or treating and evaluating a new or existing condition :143179}  Patient has been advised of split billing requirements and indicates understanding: {Yes and No:956334}  Healthy Habits:    Do you get at least three servings of calcium containing foods daily (dairy, green leafy vegetables, etc.)? { :021220::\"yes\"}    Amount of exercise or daily activities, outside of work: { :456508}    Problems taking medications regularly { :487777::\"No\"}    Medication side effects: { :394875::\"No\"}    Have you had an eye exam in the past two years? { :238432}    Do you see a dentist twice per year? { :066782}    Do you have sleep apnea, excessive snoring or daytime drowsiness?{ :265073}  {Outside tests to abstract? :561422}    {additional problems to add (Optional):841484}    Today's PHQ-2 Score:   PHQ-2 ( 1999 [employer]) 10/19/2020 10/15/2020   Q1: Little interest or pleasure in doing things 1 0   Q2: Feeling down, depressed or hopeless 1 1   PHQ-2 Score 2 1   Q1: Little interest or pleasure in doing things Several days Not at all   Q2: Feeling down, depressed or hopeless Several days Several days   PHQ-2 Score 2 1     {PHQ-2 LOOK IN ASSESSMENTS (Optional) :413832}  Abuse: Current or Past(Physical, Sexual or Emotional)- {YES/NO/NA:075050}  Do you feel safe in your environment? {YES/NO/NA:667246}        Social History     Tobacco Use     Smoking status: Never Smoker     Smokeless tobacco: Never Used   Substance Use Topics     Alcohol use: Yes     Comment: socially     If you drink alcohol do you typically have >3 drinks per day or >7 " "drinks per week? {ETOH :298345}                     Reviewed orders with patient.  Reviewed health maintenance and updated orders accordingly - {Yes/No:499605::\"Yes\"}  {Chronicprobdata (Optional):567371}    {Mammo Decision Support (Optional):270006}    Pertinent mammograms are reviewed under the imaging tab.  History of abnormal Pap smear: {PAP HX:485019}  PAP / HPV 6/11/2013   PAP NIL     Reviewed and updated as needed this visit by clinical staff                 Reviewed and updated as needed this visit by Provider                {HISTORY OPTIONS (Optional):062587}    ROS:  { :280638}    OBJECTIVE:   There were no vitals taken for this visit.  EXAM:  {Exam Choices:910016}    {Diagnostic Test Results (Optional):382264::\"Diagnostic Test Results:\",\"Labs reviewed in Epic\"}    ASSESSMENT/PLAN:   {Diag Picklist:551042}    Patient has been advised of split billing requirements and indicates understanding: {YES / NO:232315::\"Yes\"}  COUNSELING:   {FEMALE COUNSELING MESSAGES:435999::\"Reviewed preventive health counseling, as reflected in patient instructions\"}    Estimated body mass index is 32.92 kg/m  as calculated from the following:    Height as of 8/24/20: 1.63 m (5' 4.17\").    Weight as of 8/24/20: 87.5 kg (192 lb 12.8 oz).    {Weight Management Plan (ACO) Complete if BMI is abnormal-  Ages 18-64  BMI >24.9.  Age 65+ with BMI <23 or >30 (Optional):937512}    She reports that she has never smoked. She has never used smokeless tobacco.      Counseling Resources:  ATP IV Guidelines  Pooled Cohorts Equation Calculator  Breast Cancer Risk Calculator  BRCA-Related Cancer Risk Assessment: FHS-7 Tool  FRAX Risk Assessment  ICSI Preventive Guidelines  Dietary Guidelines for Americans, 2010  USDA's MyPlate  ASA Prophylaxis  Lung CA Screening    Shahla Doty NP  [facility]  "
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
## 2020-10-20 NOTE — PATIENT INSTRUCTIONS
Preventive Health Recommendations  Female Ages 26 - 39  Yearly exam:   See your health care provider every year in order to    Review health changes.     Discuss preventive care.      Review your medicines if you your doctor has prescribed any.    Until age 30: Get a Pap test every three years (more often if you have had an abnormal result).    After age 30: Talk to your doctor about whether you should have a Pap test every 3 years or have a Pap test with HPV screening every 5 years.   You do not need a Pap test if your uterus was removed (hysterectomy) and you have not had cancer.  You should be tested each year for STDs (sexually transmitted diseases), if you're at risk.   Talk to your provider about how often to have your cholesterol checked.  If you are at risk for diabetes, you should have a diabetes test (fasting glucose).  Shots: Get a flu shot each year. Get a tetanus shot every 10 years.   Nutrition:     Eat at least 5 servings of fruits and vegetables each day.    Eat whole-grain bread, whole-wheat pasta and brown rice instead of white grains and rice.    Get adequate Calcium and Vitamin D.     Lifestyle    Exercise at least 150 minutes a week (30 minutes a day, 5 days of the week). This will help you control your weight and prevent disease.    Limit alcohol to one drink per day.    No smoking.     Wear sunscreen to prevent skin cancer.    See your dentist every six months for an exam and cleaning.    Patient Education     Monthly Mole Check Chart  Anyone can get skin cancer. It doesn t matter what your skin color is. Doing a monthly skin check is an important way to spot early signs of melanoma. Melanoma is the deadliest type of skin cancer. But if it s found early, it can be treated. Regular skin checks can help you track any changes in your skin.  Getting started  Each month, check your body for any spots such as freckles, age spots, and moles. Use this sheet to help you by doing the  followin. Number each spot you find on the images below.  2. Record the details for each spot, along with the date, on the chart at the bottom of the page.  3. Keep all of your completed charts. This will help you track any changes in your skin over time.  What to look for  When doing a skin check, be sure to use the ABCDEs of melanoma. This means checking spots and moles for the following:      Asymmetry. One half of the mole is not like the other half.    Border. The edges are not smooth but ragged, notched, or blurred.    Color. Color varies from 1 part of the mole to another and may be tan, brown, or black. In some cases the color can be white, red, or blue.    Diameter. The mole is larger than 6 mm (size of a pencil eraser).    Evolving. The mole is getting larger or changing its shape or color.  How to check  Stand before a full-length mirror and check all parts of your body. For spots on your back or other areas you can't see, have a family member or friend do this for you. Or you can also use a hand mirror to check hard-to-see areas such as your back, buttocks, back of the neck, and scalp. To get a better look when checking your scalp, part your hair.  When to call your healthcare provider  Call your healthcare provider if any of your moles:    Hurt    Itch    Ooze    Bleed    Thicken    Become crusty    Show any of the ABCDEs of melanoma  Monthly Skin Check Chart  Date       Mole #    Asymmetry:  What is the mole's shape? Border of mole Color of mole Diameter of mole Evolving: How has mole changed?                                                                                                   Date Last Reviewed: 3/1/2017    2726-1818 [employer]. [address] [place] [address]. All rights reserved. This information is not intended as a substitute for professional medical care. Always follow your healthcare professional's instructions.

## 2020-10-22 LAB
COPATH REPORT: NORMAL
PAP: NORMAL

## 2020-10-22 NOTE — RESULT ENCOUNTER NOTE
Darrius Nur,    Thank you for your recent office visit.    Here are your recent results.  Your labs show your cholesterol is slightly elevated, consider taking a daily fish oils supplement.  Diet and exercise can also help.  We can recheck fasting labs in one year.     Feel free to contact me via [employer] or call the clinic at [phone].    Sincerely,    COOPER Chew, FNP-BC

## 2021-01-05 DIAGNOSIS — N93.9 VAGINAL BLEEDING: ICD-10-CM

## 2021-01-07 RX ORDER — NORGESTIMATE AND ETHINYL ESTRADIOL 0.25-0.035
KIT ORAL
Qty: 56 TABLET | Refills: 0 | OUTPATIENT
Start: 2021-01-07

## 2021-02-05 ENCOUNTER — ANCILLARY PROCEDURE (OUTPATIENT)
Dept: GENERAL RADIOLOGY | Facility: CLINIC | Age: 31
End: 2021-02-05
Attending: NURSE PRACTITIONER
Payer: OTHER GOVERNMENT

## 2021-02-05 ENCOUNTER — OFFICE VISIT (OUTPATIENT)
Dept: FAMILY MEDICINE | Facility: CLINIC | Age: 31
End: 2021-02-05
Payer: OTHER GOVERNMENT

## 2021-02-05 VITALS
HEIGHT: 65 IN | TEMPERATURE: 99.4 F | HEART RATE: 61 BPM | BODY MASS INDEX: 29.99 KG/M2 | SYSTOLIC BLOOD PRESSURE: 111 MMHG | RESPIRATION RATE: 16 BRPM | OXYGEN SATURATION: 97 % | DIASTOLIC BLOOD PRESSURE: 72 MMHG | WEIGHT: 180 LBS

## 2021-02-05 DIAGNOSIS — E66.811 CLASS 1 OBESITY WITHOUT SERIOUS COMORBIDITY WITH BODY MASS INDEX (BMI) OF 30.0 TO 30.9 IN ADULT, UNSPECIFIED OBESITY TYPE: ICD-10-CM

## 2021-02-05 DIAGNOSIS — S93.401A SPRAIN OF RIGHT ANKLE, UNSPECIFIED LIGAMENT, INITIAL ENCOUNTER: ICD-10-CM

## 2021-02-05 DIAGNOSIS — S93.401A SPRAIN OF RIGHT ANKLE, UNSPECIFIED LIGAMENT, INITIAL ENCOUNTER: Primary | ICD-10-CM

## 2021-02-05 PROCEDURE — 99213 OFFICE O/P EST LOW 20 MIN: CPT | Performed by: NURSE PRACTITIONER

## 2021-02-05 PROCEDURE — 73610 X-RAY EXAM OF ANKLE: CPT | Mod: RT | Performed by: RADIOLOGY

## 2021-02-05 ASSESSMENT — PAIN SCALES - GENERAL: PAINLEVEL: WORST PAIN (10)

## 2021-02-05 ASSESSMENT — MIFFLIN-ST. JEOR: SCORE: 1540.52

## 2021-02-05 NOTE — PATIENT INSTRUCTIONS
At [facility], we strive to deliver an exceptional experience to you, every time we see you. If you receive a survey, please complete it as we do value your feedback.  If you have MyChart, you can expect to receive results automatically within 24 hours of their completion.  Your provider will send a note interpreting your results as well.   If you do not have MyChart, you should receive your results in about a week by mail.    Your care team:                            Family Medicine Internal Medicine   MD Jorge Quintana MD Shantel Branch-Fleming, MD Srinivasa Vaka, MD Katya Belousova, PAIRIS Ruiz, APRN CNP    Vaibhav Apple, MD Pediatrics   Tomas Yuen, PASandrineC  Eliza Almaguer, CNP MD Rani Kennedy APRN CNP   MD Anne Ortiz MD Deborah Mielke, MD Ivelisse Jacobson, APRN [facility]      Clinic hours: Monday - Thursday 7 am-6 pm; Fridays 7 am-5 pm.   Urgent care: Monday - Friday 11 am-9 pm; Saturday and Sunday 9 am-5 pm.    Clinic: [phone]       [facility]: Monday - Thursday 8 am - 7 pm; Friday 8 am - 6 pm  [facility]: [phone]     Use www.[email or web address].org for 24/7 diagnosis and treatment of dozens of conditions.    Patient Education     Treating Ankle Sprains  Treatment will depend on how bad your sprain is. For a severe sprain, healing may take 3 months or more.  Right after your injury: Use R.I.C.E.    BIG: Rest: At first, keep weight off the ankle as much as you can. You may be given crutches to help you walk without putting weight on the ankle.    BIG: Ice: Put an ice pack on the ankle for 20 minutes. Remove the pack and wait at least 30 minutes. Repeat for up to 3 days. This helps reduce swelling.    BIG: Compression: To reduce swelling and keep the joint stable, you may need to wrap the ankle with an elastic bandage. For more severe sprains, you may need an ankle  brace, a boot, or a cast.    BIG: Elevation: To reduce swelling, keep your ankle raised above your heart when you sit or lie down.  Medicine  Your healthcare provider may suggest oral nonsteroidal anti-inflammatory medicine (NSAIDs), such as ibuprofen. This relieves the pain and helps reduce swelling. Be sure to take your medicine as directed.  Exercises    After about 2 to 3 weeks, you may be given exercises to strengthen the ligaments and muscles in the ankle. Doing these exercises will help prevent another ankle sprain. Exercises may include standing on your toes and then on your heels and doing ankle curls.    Sit on the edge of a sturdy table or lie on your back.    Pull your toes toward you. Then point them away from you. Repeat for 2 to 3 minutes.  [employer] last reviewed this educational content on 1/1/2018 2000-2020 [employer]). [address] [place] [address]. All rights reserved. This information is not intended as a substitute for professional medical care. Always follow your healthcare professional's instructions.
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
## 2021-03-23 DIAGNOSIS — M54.50 BILATERAL LOW BACK PAIN WITHOUT SCIATICA, UNSPECIFIED CHRONICITY: Primary | ICD-10-CM

## 2021-03-23 DIAGNOSIS — M25.571 PAIN IN JOINT INVOLVING ANKLE AND FOOT, RIGHT: ICD-10-CM

## 2021-03-23 DIAGNOSIS — S93.401S SPRAIN OF RIGHT ANKLE, UNSPECIFIED LIGAMENT, SEQUELA: ICD-10-CM

## 2021-03-26 ENCOUNTER — THERAPY VISIT (OUTPATIENT)
Dept: PHYSICAL THERAPY | Facility: CLINIC | Age: 31
End: 2021-03-26
Attending: NURSE PRACTITIONER
Payer: OTHER GOVERNMENT

## 2021-03-26 DIAGNOSIS — S93.401S SPRAIN OF RIGHT ANKLE, UNSPECIFIED LIGAMENT, SEQUELA: ICD-10-CM

## 2021-03-26 DIAGNOSIS — M54.50 BILATERAL LOW BACK PAIN WITHOUT SCIATICA, UNSPECIFIED CHRONICITY: ICD-10-CM

## 2021-03-26 DIAGNOSIS — M25.571 PAIN IN JOINT INVOLVING ANKLE AND FOOT, RIGHT: ICD-10-CM

## 2021-03-26 DIAGNOSIS — M54.2 NECK PAIN: ICD-10-CM

## 2021-03-26 DIAGNOSIS — M54.6 BILATERAL THORACIC BACK PAIN, UNSPECIFIED CHRONICITY: Primary | ICD-10-CM

## 2021-03-26 PROCEDURE — 97161 PT EVAL LOW COMPLEX 20 MIN: CPT | Mod: GP | Performed by: PHYSICAL THERAPIST

## 2021-03-26 PROCEDURE — 97112 NEUROMUSCULAR REEDUCATION: CPT | Mod: GP | Performed by: PHYSICAL THERAPIST

## 2021-03-26 PROCEDURE — 97110 THERAPEUTIC EXERCISES: CPT | Mod: GP | Performed by: PHYSICAL THERAPIST

## 2021-03-26 NOTE — PROGRESS NOTES
Physical Therapy Initial Evaluation  Subjective:    Patient Health History  Liudmila Collado being seen for Ankle sprain and back and neck pain..     Problem began: 2/4/2021.   Problem occurred: Fell on ice.   Pain is reported as 5/10 on pain scale.  General health as reported by patient is fair.  Pertinent medical history includes: depression, migraines/headaches, numbness/tingling and overweight.   Red flags:  None as reported by patient.  Medical allergies: none.   Surgeries include:  Other. Other surgery history details: Oral and benign mass removed from breast..    Current medications:  Anti-depressants. Other medications details: Antihistamine..    Current occupation is ..   Primary job tasks include:  Computer work, prolonged standing and repetitive tasks.                  Therapist Generated HPI Evaluation  Problem details: Pt presents to clinic with complaints of lateral R ankle pain. Pt reports slipping on ice on 2/4/2021 and twisted ankle. Pt reports having long history of bilateral ankle instability. Pt had X-ray, negative for fracture. Pt was given crutches and ankle brace. Pt has continued to use ankle brace for mobility. Pt had MD appointment on 2/4/2021. .         Type of problem:  Right ankle.    This is a new condition.  Condition occurred with:  A fall/slip.  Where condition occurred: in the community.  Patient reports pain:  Lateral.  Pain is described as sharp and is intermittent.  Pain radiates to:  No radiation. Pain is worse in the A.M..  Since onset symptoms are gradually improving.  Associated symptoms:  Buckling/giving out and loss of motion/stiffness. Symptoms are exacerbated by ascending stairs, activity, standing, walking, weight bearing and descending stairs  and relieved by bracing/immobilizing, NSAID's and ice.  Special tests included:  X-ray.  Past treatment: none. There was none improvement following previous treatment.  Restrictions due to condition include:  Working  in normal job without restrictions.  Barriers include:  None as reported by patient.    Therapist Generated HPI Evaluation  Problem details: Pt reports >5 year history of R sided low back and L sided mid-upper back pain for unknown reasons. Pt having most difficulty with prolonged sitting.         Type of problem:  Lumbar.    This is a chronic condition.  Condition occurred with:  Insidious onset.  Where condition occurred: for unknown reasons.  Patient reports pain:  Lower lumbar spine and lumbar spine right.  Pain is described as aching and sharp and is intermittent.  Pain radiates to:  No radiation. Pain is worse in the P.M..  Since onset symptoms are unchanged.  Associated symptoms:  Loss of motion/stiffness, numbness and tingling. Symptoms are exacerbated by sitting, lifting and standing  and relieved by activity/movement.  Imaging testing: none.  Previous treatment includes chiropractic. There was mild improvement following previous treatment.  Restrictions due to condition include:  Working in normal job without restrictions.  Barriers include:  None as reported by patient.                        Objective:  System    Ankle/Foot Evaluation  ROM:    AROM:    Dorsiflexion:  Left:   15  Right:   10  Plantarflexion:  Left:  60    Right:  60  Inversion:  Left:  WNL     Right:  WNL  Eversion:  WNL     Right:  WNL              PALPATION:     Right ankle tenderness present at:   anterior talofibular ligament; posterior talofibular ligament and calcaneofibular ligament  EDEMA:   Left ankle edema present at: 52 cm  Right ankle edema present at:  53 cm      Figure 8 left: 52 cmFigure 8 right: 53 cm    FUNCTIONAL TESTS:         Quad:      Bilateral Leg Squat:  Control is moderate loss of control    Proprioception:  Stork Balance Test: Left: EO x 30 sec  Right: EO x 5 sec        Lumbar/SI Evaluation  ROM:    AROM Lumbar:   Flexion:            To floor -pain  Ext:                    WNL slight pain   Side Bend:         Left:  WNL    Right:  WNL  Rotation:           Left:  75%    Right:  75%  Side Glide:        Left:     Right:         Strength: Glute med 4-/5 R, 4/5 L, glute max 4/5 R, 4/5 L  Lumbar Myotomes:  normal                  Neural Tension/Mobility:      Left side:SLR; Femoral Nerve or Slump  negative.     Right side:   Femoral Nerve; Slump or SLR  negative.   Lumbar Palpation:    Tenderness present at Left:    Erector Spinae  Tenderness present at Right: Erector Spinae                                                     General     ROS    Assessment/Plan:    Patient is a 30 year old female with lumbar and right side ankle complaints.    Patient has the following significant findings with corresponding treatment plan.                Diagnosis 1:  R ankle sprain  Pain -  hot/cold therapy, manual therapy, self management, education and home program  Decreased ROM/flexibility - manual therapy, therapeutic exercise, therapeutic activity and home program  Decreased strength - therapeutic exercise, therapeutic activities and home program  Impaired balance - neuro re-education, therapeutic activities and home program  Impaired muscle performance - neuro re-education and home program  Decreased function - therapeutic activities and home program  Diagnosis 2:  Low back pain   Pain -  hot/cold therapy, manual therapy, self management, education and home program  Decreased ROM/flexibility - manual therapy, therapeutic exercise, therapeutic activity and home program  Decreased strength - therapeutic exercise, therapeutic activities and home program  Impaired muscle performance - neuro re-education and home program  Decreased function - therapeutic activities and home program  Impaired posture - neuro re-education, therapeutic activities and home program    Therapy Evaluation Codes:   1) History comprised of:   Personal factors that impact the plan of care:      None.    Comorbidity factors that impact the plan of care are:       Depression, Migraines/headaches and Overweight.     Medications impacting care: Anti-depressant.  2) Examination of Body Systems comprised of:   Body structures and functions that impact the plan of care:      Ankle and Lumbar spine.   Activity limitations that impact the plan of care are:      Bending, Lifting, Squatting/kneeling, Standing and Walking.  3) Clinical presentation characteristics are:   Stable/Uncomplicated.  4) Decision-Making    Low complexity using standardized patient assessment instrument and/or measureable assessment of functional outcome.  Cumulative Therapy Evaluation is: Low complexity.    Previous and current functional limitations:  (See Goal Flow Sheet for this information)    Short term and Long term goals: (See Goal Flow Sheet for this information)     Communication ability:  Patient appears to be able to clearly communicate and understand verbal and written communication and follow directions correctly.  Treatment Explanation - The following has been discussed with the patient:   RX ordered/plan of care  Anticipated outcomes  Possible risks and side effects  This patient would benefit from PT intervention to resume normal activities.   Rehab potential is good.    Frequency:  1 X week, once daily  Duration:  for 8 weeks  Discharge Plan:  Achieve all LTG.  Independent in home treatment program.  Return to previous functional level by discharge.  Reach maximal therapeutic benefit.    Please refer to the daily flowsheet for treatment today, total treatment time and time spent performing 1:1 timed codes.

## 2021-04-02 ENCOUNTER — THERAPY VISIT (OUTPATIENT)
Dept: PHYSICAL THERAPY | Facility: CLINIC | Age: 31
End: 2021-04-02
Attending: NURSE PRACTITIONER
Payer: OTHER GOVERNMENT

## 2021-04-02 DIAGNOSIS — M54.6 BILATERAL THORACIC BACK PAIN, UNSPECIFIED CHRONICITY: ICD-10-CM

## 2021-04-02 DIAGNOSIS — S93.401S SPRAIN OF RIGHT ANKLE, UNSPECIFIED LIGAMENT, SEQUELA: ICD-10-CM

## 2021-04-02 DIAGNOSIS — M54.2 NECK PAIN: ICD-10-CM

## 2021-04-02 DIAGNOSIS — M54.50 BILATERAL LOW BACK PAIN WITHOUT SCIATICA, UNSPECIFIED CHRONICITY: Primary | ICD-10-CM

## 2021-04-02 PROCEDURE — 97110 THERAPEUTIC EXERCISES: CPT | Mod: GP | Performed by: PHYSICAL THERAPIST

## 2021-04-02 PROCEDURE — 97112 NEUROMUSCULAR REEDUCATION: CPT | Mod: GP | Performed by: PHYSICAL THERAPIST

## 2021-04-02 PROCEDURE — 97140 MANUAL THERAPY 1/> REGIONS: CPT | Mod: GP | Performed by: PHYSICAL THERAPIST

## 2021-05-07 NOTE — PROGRESS NOTES
Discharge Note    Progress reporting period is from initial evaluation date (please see noted date below) to Apr 2, 2021.  Linked Episodes   Type: Episode: Status: Noted: Resolved: Last update: Updated by:   PHYSICAL THERAPY Low back pain, R ankle 3/26/2021 Active 3/26/2021  4/2/2021  1:50 PM Jono Leung, PT      Comments:       Liudmila failed to follow up and current status is unknown.  Please see information below for last relevant information on current status.  Patient seen for 2 visits.    SUBJECTIVE  Subjective changes noted by patient:  Has stopped using brace for ankle without difficulty. Noticing slight increase in upper back/neck pain after prolonged driving this week.   .  Current pain level is 6/10(ankle 2/10 pain, upper back 6/10).     Previous pain level was  6/10.   Changes in function:  Yes (See Goal flowsheet attached for changes in current functional level)  Adverse reaction to treatment or activity: None    OBJECTIVE  Changes noted in objective findings: Cervical AROM: extension 75% slight pulling L, flexion WNL -pain, L rotation 60 deg +pain L, R rotation 70 deg +pain L, L SB 20 deg +pain L, R SB 25 deg     ASSESSMENT/PLAN  Diagnosis: R ankle sprain   Updated problem list and treatment plan:   Pain - HEP  Decreased ROM/flexibility - HEP  Decreased strength - HEP  Impaired muscle performance - HEP  Impaired posture - HEP  STG/LTGs have been met or progress has been made towards goals:  Yes, please see goal flowsheet for most current information  Assessment of Progress: current status is unknown.    Last current status: Pt is progressing as expected   Self Management Plans:  HEP  I have re-evaluated this patient and find that the nature, scope, duration and intensity of the therapy is appropriate for the medical condition of the patient.  Liudmila continues to require the following intervention to meet STG and LTG's:  HEP.    Recommendations:  Discharge with current home program.  Patient to follow  up with MD as needed.    Please refer to the daily flowsheet for treatment today, total treatment time and time spent performing 1:1 timed codes.

## 2021-05-10 ENCOUNTER — E-VISIT (OUTPATIENT)
Dept: FAMILY MEDICINE | Facility: CLINIC | Age: 31
End: 2021-05-10
Payer: OTHER GOVERNMENT

## 2021-05-10 DIAGNOSIS — F43.23 ADJUSTMENT DISORDER WITH MIXED ANXIETY AND DEPRESSED MOOD: ICD-10-CM

## 2021-05-10 PROCEDURE — 99422 OL DIG E/M SVC 11-20 MIN: CPT | Performed by: NURSE PRACTITIONER

## 2021-05-10 RX ORDER — ESCITALOPRAM OXALATE 20 MG/1
20 TABLET ORAL DAILY
Qty: 90 TABLET | Refills: 1 | Status: SHIPPED | OUTPATIENT
Start: 2021-05-10 | End: 2021-05-18

## 2021-05-10 ASSESSMENT — ANXIETY QUESTIONNAIRES
GAD7 TOTAL SCORE: 4
7. FEELING AFRAID AS IF SOMETHING AWFUL MIGHT HAPPEN: NOT AT ALL
6. BECOMING EASILY ANNOYED OR IRRITABLE: NOT AT ALL
4. TROUBLE RELAXING: SEVERAL DAYS
2. NOT BEING ABLE TO STOP OR CONTROL WORRYING: SEVERAL DAYS
5. BEING SO RESTLESS THAT IT IS HARD TO SIT STILL: NOT AT ALL
7. FEELING AFRAID AS IF SOMETHING AWFUL MIGHT HAPPEN: NOT AT ALL
GAD7 TOTAL SCORE: 4
GAD7 TOTAL SCORE: 4
1. FEELING NERVOUS, ANXIOUS, OR ON EDGE: SEVERAL DAYS
3. WORRYING TOO MUCH ABOUT DIFFERENT THINGS: SEVERAL DAYS

## 2021-05-10 ASSESSMENT — PATIENT HEALTH QUESTIONNAIRE - PHQ9
SUM OF ALL RESPONSES TO PHQ QUESTIONS 1-9: 5
SUM OF ALL RESPONSES TO PHQ QUESTIONS 1-9: 5
10. IF YOU CHECKED OFF ANY PROBLEMS, HOW DIFFICULT HAVE THESE PROBLEMS MADE IT FOR YOU TO DO YOUR WORK, TAKE CARE OF THINGS AT HOME, OR GET ALONG WITH OTHER PEOPLE: SOMEWHAT DIFFICULT

## 2021-05-10 NOTE — PATIENT INSTRUCTIONS
Patient Education   Please make an appointment to follow up with your therapist and/or Psychiatric Clinic (phone: [phone]) within 1-3 days.     Return to the ED if you are having worsening thoughts/feelings of harming yourself or others, or any urgent/life-threatening concerns.     DISCHARGE RESOURCES:    [facility] [phone]     [facility] [phone] (detox), [phone] (outpatient & [facility])      Crisis Intervention: [phone] or 618-208-0070 (TTY: 276.535.1569).  Call anytime.    Suicide Awareness Voices of Education (SAVE) (www.save.org): 355-441-ZJXT (5479)    National Suicide Prevention Line (www.mentalhealthmn.org): 820-243-IBBT (6444)    National [place] on Mental Illness (www.[place].sandi.org): 210.322.3336 or 425-568-6917.    Iehh1aktn: text the word LIFE to 26135 for immediate support and crisis intervention    Mental Health Consumer/Survivor Network of [place] (www.mhcsn.net): 691.850.5016 or 359-267-3585    Mental Health Association [place] (www.mentalhealth.org): 420.830.9574 or 372-787-8333

## 2021-05-11 ASSESSMENT — PATIENT HEALTH QUESTIONNAIRE - PHQ9: SUM OF ALL RESPONSES TO PHQ QUESTIONS 1-9: 5

## 2021-05-11 ASSESSMENT — ANXIETY QUESTIONNAIRES: GAD7 TOTAL SCORE: 4

## 2021-05-17 ENCOUNTER — MYC MEDICAL ADVICE (OUTPATIENT)
Dept: FAMILY MEDICINE | Facility: CLINIC | Age: 31
End: 2021-05-17

## 2021-05-17 DIAGNOSIS — F43.23 ADJUSTMENT DISORDER WITH MIXED ANXIETY AND DEPRESSED MOOD: ICD-10-CM

## 2021-05-18 RX ORDER — ESCITALOPRAM OXALATE 20 MG/1
20 TABLET ORAL DAILY
Qty: 90 TABLET | Refills: 1 | Status: SHIPPED | OUTPATIENT
Start: 2021-05-18 | End: 2022-02-20

## 2021-06-07 DIAGNOSIS — R21 RASH: Primary | ICD-10-CM

## 2021-07-14 ENCOUNTER — ALLIED HEALTH/NURSE VISIT (OUTPATIENT)
Dept: NURSING | Facility: CLINIC | Age: 31
End: 2021-07-14

## 2021-07-14 DIAGNOSIS — Z23 NEED FOR VACCINATION: Primary | ICD-10-CM

## 2021-07-14 PROCEDURE — 99207 PR NO CHARGE NURSE ONLY: CPT

## 2021-07-14 PROCEDURE — 90471 IMMUNIZATION ADMIN: CPT

## 2021-07-14 PROCEDURE — 90715 TDAP VACCINE 7 YRS/> IM: CPT

## 2021-09-14 ENCOUNTER — OFFICE VISIT (OUTPATIENT)
Dept: DERMATOLOGY | Facility: CLINIC | Age: 31
End: 2021-09-14
Payer: OTHER GOVERNMENT

## 2021-09-14 DIAGNOSIS — L83 CONFLUENT AND RETICULATE PAPILLOMATOSIS: ICD-10-CM

## 2021-09-14 DIAGNOSIS — L85.8 KP (KERATOSIS PILARIS): Primary | ICD-10-CM

## 2021-09-14 PROCEDURE — 99213 OFFICE O/P EST LOW 20 MIN: CPT | Performed by: DERMATOLOGY

## 2021-09-14 ASSESSMENT — PAIN SCALES - GENERAL: PAINLEVEL: NO PAIN (0)

## 2021-09-14 NOTE — PATIENT INSTRUCTIONS
I recommend CeraVe SA Renewal Cream twice daily.  If this is not helpful, then switch to OTC differin gel at night.

## 2021-09-14 NOTE — LETTER
9/14/2021         RE: Liudmila Collado  [address]  [place] [facility] [place] [address]        Dear Colleague,    Thank you for referring your patient, Liudmila Collado, to the [facility]. Please see a copy of my visit note below.    [facility] Dermatology Note  Encounter Date: Sep 14, 2021  Office Visit     Dermatology Problem List:  1. CARP, central chest  - Minocycline 100mg BID until resolved or up to 6 weeks (initiated 8/2020)  2. Keratosis Pilaris - CeraVe SA Renewal cream BID or differin gel.  ____________________________________________    ASSESSMENT/PLAN:    # Keratosis pilaris. Chronic problem, stable. Discussed benign nature.   - Recommended CeraVe SA Renewal cream twice daily. If this is not helpful, then try OTC differin gel at night.    # CARP, minor recurrence on back. Self limited, minor problem.  - Discussed recurrences are common after minocycline. Has had 6 week course of minocycline in the last year with clearance. Given how minor recurrence is, I recommended we hold off on repeating oral antibiotic for now. If flares in next year, can refill.     Procedures Performed:   None.    Follow-up: prn for new or changing lesions    Staff and Scribe:     Scribe Disclosure:   I, Renetta Tejeda, am serving as a scribe to document services personally performed by this physician, Dr. Janet Donald, based on data collection and the provider's statements to me.     Provider Disclosure:   The documentation recorded by the scribe accurately reflects the services I personally performed and the decisions made by me.    Janet Donald MD    Department of Dermatology  [facility]: Phone: [phone], Fax:[phone]  [facility]: Phone: [phone], Fax: [phone]    ____________________________________________    CC: Derm Problem (Liudmila monroe  "here today for concern of bumps all over her arms, pt states \"skin on arms in red and bumpy, also located on upper thighs\" has been using SA cerVae cleanser for two weeks, which is helping somewhat. ) and Rash (has spot on left flank that showed up recently. )    HPI:  Ms. Liudmila Collado is a(n) 31 year old female who presents today as a return patient for rash.    Last seen on 8/26/20 in a virtual visit for [facility]. At that time, she was started on minocycline 100 mg BID until resolved or up to 6 weeks. She was instructed to f/u in 6 weeks to confirm resolution.    Referred placed for rash on 6/7/21 (orders only encounter, no further details).    Today, patient notes concern of bumps all over the arms. Currently using a CeraVe Salicylic Acid cleanser, which is helpful.    Patient is otherwise feeling well, without additional concerns.    Labs:  NA    Physical exam:  Vitals: There were no vitals taken for this visit.  SKIN: Focused examination of the forearms, legs, and back was performed.  - Follicular based follicles on the forearms and bilateral thighs.  - Brown scaly papules on right upper medial back and left lateral flank.  - No other lesions of concern on areas examined.     Medications:  Current Outpatient Medications   Medication     albuterol (PROAIR HFA, PROVENTIL HFA, VENTOLIN HFA) 108 (90 BASE) MCG/ACT inhaler     escitalopram (LEXAPRO) 20 MG tablet     norgestim-eth estrad triphasic (ORTHO TRI-CYCLEN LO) 0.18/0.215/0.25 MG-25 MCG tablet     No current facility-administered medications for this visit.      Past Medical History:   Patient Active Problem List   Diagnosis     CARDIOVASCULAR SCREENING; LDL GOAL LESS THAN 160     Encounter for other general counseling or advice on contraception     Class 1 obesity without serious comorbidity with body mass index (BMI) of 30.0 to 30.9 in adult, unspecified obesity type     Neck pain     Chronic back pain, unspecified back location, unspecified back pain " laterality     Pain in joint, ankle and foot, unspecified laterality     Situational mixed anxiety and depressive disorder     Anxiety     Weight gain     Past Medical History:   Diagnosis Date     Fibroadenoma      Seasonal rhinitis         CC Referred Self, MD  No address on file on close of this encounter.      Again, thank you for allowing me to participate in the care of your patient.        Sincerely,        Janet Donald MD

## 2021-09-14 NOTE — PROGRESS NOTES
"[facility] Dermatology Note  Encounter Date: Sep 14, 2021  Office Visit     Dermatology Problem List:  1. CARP, central chest  - Minocycline 100mg BID until resolved or up to 6 weeks (initiated 8/2020)  2. Keratosis Pilaris - CeraVe SA Renewal cream BID or differin gel.  ____________________________________________    ASSESSMENT/PLAN:    # Keratosis pilaris. Chronic problem, stable. Discussed benign nature.   - Recommended CeraVe SA Renewal cream twice daily. If this is not helpful, then try OTC differin gel at night.    # CARP, minor recurrence on back. Self limited, minor problem.  - Discussed recurrences are common after minocycline. Has had 6 week course of minocycline in the last year with clearance. Given how minor recurrence is, I recommended we hold off on repeating oral antibiotic for now. If flares in next year, can refill.     Procedures Performed:   None.    Follow-up: prn for new or changing lesions    Staff and Scribe:     Scribe Disclosure:   I, Renetta Tejeda, am serving as a scribe to document services personally performed by this physician, Dr. Janet Donald, based on data collection and the provider's statements to me.     Provider Disclosure:   The documentation recorded by the scribe accurately reflects the services I personally performed and the decisions made by me.    Janet Donald MD    Department of Dermatology  [facility]: Phone: [phone], Fax:[phone]  [facility]: Phone: [phone], Fax: [phone]    ____________________________________________    CC: Derm Problem (Liudmila is here today for concern of bumps all over her arms, pt states \"skin on arms in red and bumpy, also located on upper thighs\" has been using SA cerVae cleanser for two weeks, which is helping somewhat. ) and Rash (has spot on left flank that showed up " recently. )    HPI:  Ms. Liudmila Collado is a(n) 31 year old female who presents today as a return patient for rash.    Last seen on 8/26/20 in a virtual visit for CARP. At that time, she was started on minocycline 100 mg BID until resolved or up to 6 weeks. She was instructed to f/u in 6 weeks to confirm resolution.    Referred placed for rash on 6/7/21 (orders only encounter, no further details).    Today, patient notes concern of bumps all over the arms. Currently using a CeraVe Salicylic Acid cleanser, which is helpful.    Patient is otherwise feeling well, without additional concerns.    Labs:  NA    Physical exam:  Vitals: There were no vitals taken for this visit.  SKIN: Focused examination of the forearms, legs, and back was performed.  - Follicular based follicles on the forearms and bilateral thighs.  - Brown scaly papules on right upper medial back and left lateral flank.  - No other lesions of concern on areas examined.     Medications:  Current Outpatient Medications   Medication     albuterol (PROAIR HFA, PROVENTIL HFA, VENTOLIN HFA) 108 (90 BASE) MCG/ACT inhaler     escitalopram (LEXAPRO) 20 MG tablet     norgestim-eth estrad triphasic (ORTHO TRI-CYCLEN LO) 0.18/0.215/0.25 MG-25 MCG tablet     No current facility-administered medications for this visit.      Past Medical History:   Patient Active Problem List   Diagnosis     CARDIOVASCULAR SCREENING; LDL GOAL LESS THAN 160     Encounter for other general counseling or advice on contraception     Class 1 obesity without serious comorbidity with body mass index (BMI) of 30.0 to 30.9 in adult, unspecified obesity type     Neck pain     Chronic back pain, unspecified back location, unspecified back pain laterality     Pain in joint, ankle and foot, unspecified laterality     Situational mixed anxiety and depressive disorder     Anxiety     Weight gain     Past Medical History:   Diagnosis Date     Fibroadenoma      Seasonal rhinitis         CC Referred  Self, MD  No address on file on close of this encounter.

## 2021-11-28 ENCOUNTER — HEALTH MAINTENANCE LETTER (OUTPATIENT)
Age: 31
End: 2021-11-28

## 2022-02-18 DIAGNOSIS — F43.23 ADJUSTMENT DISORDER WITH MIXED ANXIETY AND DEPRESSED MOOD: ICD-10-CM

## 2022-02-20 RX ORDER — ESCITALOPRAM OXALATE 20 MG/1
20 TABLET ORAL DAILY
Qty: 90 TABLET | Refills: 0 | Status: SHIPPED | OUTPATIENT
Start: 2022-02-20 | End: 2022-06-29

## 2022-02-20 NOTE — TELEPHONE ENCOUNTER
Needs updated-sent on my chart.  Mallory Ramos RN    PHQ-9 score:    PHQ 5/10/2021   PHQ-9 Total Score 5   Q9: Thoughts of better off dead/self-harm past 2 weeks Not at all

## 2022-02-20 NOTE — TELEPHONE ENCOUNTER
Medication is being filled for 1 time refill only due to:  Patient needs to be seen because needs appt.     PHQ-9 score:    PHQ 2/20/2022   PHQ-9 Total Score 4   Q9: Thoughts of better off dead/self-harm past 2 weeks Not at all

## 2022-08-02 ENCOUNTER — OFFICE VISIT (OUTPATIENT)
Dept: FAMILY MEDICINE | Facility: CLINIC | Age: 32
End: 2022-08-02
Payer: COMMERCIAL

## 2022-08-02 VITALS
HEIGHT: 65 IN | OXYGEN SATURATION: 99 % | RESPIRATION RATE: 16 BRPM | TEMPERATURE: 98.4 F | SYSTOLIC BLOOD PRESSURE: 117 MMHG | HEART RATE: 64 BPM | BODY MASS INDEX: 30.32 KG/M2 | WEIGHT: 182 LBS | DIASTOLIC BLOOD PRESSURE: 79 MMHG

## 2022-08-02 DIAGNOSIS — L83 CONFLUENT AND RETICULATE PAPILLOMATOSIS: ICD-10-CM

## 2022-08-02 DIAGNOSIS — Z91.038 ALLERGIC REACTION TO INSECT BITE: ICD-10-CM

## 2022-08-02 DIAGNOSIS — F43.23 ADJUSTMENT DISORDER WITH MIXED ANXIETY AND DEPRESSED MOOD: Primary | ICD-10-CM

## 2022-08-02 PROCEDURE — 99214 OFFICE O/P EST MOD 30 MIN: CPT | Performed by: STUDENT IN AN ORGANIZED HEALTH CARE EDUCATION/TRAINING PROGRAM

## 2022-08-02 RX ORDER — ESCITALOPRAM OXALATE 20 MG/1
20 TABLET ORAL DAILY
Qty: 90 TABLET | Refills: 3 | Status: SHIPPED | OUTPATIENT
Start: 2022-08-02 | End: 2023-05-30

## 2022-08-02 RX ORDER — MINOCYCLINE HYDROCHLORIDE 100 MG/1
100 CAPSULE ORAL 2 TIMES DAILY
Qty: 84 CAPSULE | Refills: 0 | Status: SHIPPED | OUTPATIENT
Start: 2022-08-02 | End: 2022-09-13

## 2022-08-02 RX ORDER — LEVOCETIRIZINE DIHYDROCHLORIDE 5 MG/1
TABLET, FILM COATED ORAL
Qty: 60 TABLET | Refills: 1 | Status: SHIPPED | OUTPATIENT
Start: 2022-08-02 | End: 2023-06-05

## 2022-08-02 ASSESSMENT — PATIENT HEALTH QUESTIONNAIRE - PHQ9
SUM OF ALL RESPONSES TO PHQ QUESTIONS 1-9: 6
10. IF YOU CHECKED OFF ANY PROBLEMS, HOW DIFFICULT HAVE THESE PROBLEMS MADE IT FOR YOU TO DO YOUR WORK, TAKE CARE OF THINGS AT HOME, OR GET ALONG WITH OTHER PEOPLE: SOMEWHAT DIFFICULT
SUM OF ALL RESPONSES TO PHQ QUESTIONS 1-9: 6

## 2022-08-02 ASSESSMENT — ANXIETY QUESTIONNAIRES
6. BECOMING EASILY ANNOYED OR IRRITABLE: SEVERAL DAYS
4. TROUBLE RELAXING: NOT AT ALL
1. FEELING NERVOUS, ANXIOUS, OR ON EDGE: SEVERAL DAYS
IF YOU CHECKED OFF ANY PROBLEMS ON THIS QUESTIONNAIRE, HOW DIFFICULT HAVE THESE PROBLEMS MADE IT FOR YOU TO DO YOUR WORK, TAKE CARE OF THINGS AT HOME, OR GET ALONG WITH OTHER PEOPLE: NOT DIFFICULT AT ALL
GAD7 TOTAL SCORE: 3
3. WORRYING TOO MUCH ABOUT DIFFERENT THINGS: SEVERAL DAYS
7. FEELING AFRAID AS IF SOMETHING AWFUL MIGHT HAPPEN: NOT AT ALL
GAD7 TOTAL SCORE: 3
7. FEELING AFRAID AS IF SOMETHING AWFUL MIGHT HAPPEN: NOT AT ALL
5. BEING SO RESTLESS THAT IT IS HARD TO SIT STILL: NOT AT ALL
2. NOT BEING ABLE TO STOP OR CONTROL WORRYING: NOT AT ALL
8. IF YOU CHECKED OFF ANY PROBLEMS, HOW DIFFICULT HAVE THESE MADE IT FOR YOU TO DO YOUR WORK, TAKE CARE OF THINGS AT HOME, OR GET ALONG WITH OTHER PEOPLE?: NOT DIFFICULT AT ALL

## 2022-08-02 ASSESSMENT — PAIN SCALES - GENERAL: PAINLEVEL: NO PAIN (0)

## 2022-08-02 NOTE — PROGRESS NOTES
Assessment & Plan   1. Adjustment disorder with mixed anxiety and depressed mood  controlled  - escitalopram (LEXAPRO) 20 MG tablet; Take 1 tablet (20 mg) by mouth daily for 90 days Keep follow up appt for further refills.  Dispense: 90 tablet; Refill: 3    2. Confluent and reticulate papillomatosis    -start minocycline (MINOCIN) 100 MG capsule; Take 1 capsule (100 mg) by mouth 2 times daily for 42 days  Dispense: 84 capsule; Refill: 0  - Adult Dermatology Referral; Future  Explainedd to patient that medication is teratogenic. She is currently on OCP the is low interaction with antibiotic  3. Allergic reaction to insect bite    - levocetirizine (XYZAL) 5 MG tablet; May increase to twice daily if inadequate control with once daily  Dispense: 60 tablet; Refill: 1          Return in about 4 weeks (around 8/30/2022) for Follow up, Routine preventive, in person.    Amanda Sood MD  [facility] BELINDA Nur is a 32 year old, presenting for the following health issues:  Recheck Medication      History of Present Illness       Mental Health Follow-up:  Patient presents to follow-up on Depression & Anxiety.Patient's depression since last visit has been:  Good  The patient is not having other symptoms associated with depression.  Patient's anxiety since last visit has been:  Good  The patient is not having other symptoms associated with anxiety.  Any significant life events: other  Patient is not feeling anxious or having panic attacks.  Patient has no concerns about alcohol or drug use.    Reason for visit:  Need to get extension of anxiety meds. Would like referral for dermatology.    She eats 0-1 servings of fruits and vegetables daily.She consumes 1 sweetened beverage(s) daily.She exercises with enough effort to increase her heart rate 10 to 19 minutes per day.  She exercises with enough effort to increase her heart rate 3 or less days per week.   She is taking medications  regularly.    Today's PHQ-9         PHQ-9 Total Score: 6    PHQ-9 Q9 Thoughts of better off dead/self-harm past 2 weeks :   Not at all    How difficult have these problems made it for you to do your work, take care of things at home, or get along with other people: Somewhat difficult  Today's HI-7 Score: 3       Social History     Tobacco Use     Smoking status: Never Smoker     Smokeless tobacco: Never Used   Substance Use Topics     Alcohol use: Yes     Comment: socially     Drug use: No     PHQ 5/10/2021 2/20/2022 8/2/2022   PHQ-9 Total Score 5 4 6   Q9: Thoughts of better off dead/self-harm past 2 weeks Not at all Not at all Not at all     HI-7 SCORE 8/24/2020 5/10/2021 8/2/2022   Total Score - 4 (minimal anxiety) 3 (minimal anxiety)   Total Score 4 4 3     Last PHQ-9 8/2/2022   1.  Little interest or pleasure in doing things 0   2.  Feeling down, depressed, or hopeless 1   3.  Trouble falling or staying asleep, or sleeping too much 1   4.  Feeling tired or having little energy 0   5.  Poor appetite or overeating 1   6.  Feeling bad about yourself 1   7.  Trouble concentrating 2   8.  Moving slowly or restless 0   Q9: Thoughts of better off dead/self-harm past 2 weeks 0   PHQ-9 Total Score 6   Difficulty at work, home, or with people -     HI-7  8/2/2022   1. Feeling nervous, anxious, or on edge 1   2. Not being able to stop or control worrying 0   3. Worrying too much about different things 1   4. Trouble relaxing 0   5. Being so restless that it is hard to sit still 0   6. Becoming easily annoyed or irritable 1   7. Feeling afraid, as if something awful might happen 0   HI-7 Total Score 3   If you checked any problems, how difficult have they made it for you to do your work, take care of things at home, or get along with other people? Not difficult at all     Patient has a rash on her back.She has seen dermatologist for it in the past.  She does have swelling at the site of bug bit. There is no sob or  "throat swelling or hives. Swelling is responsive to antihistamine.          Review of Systems   Constitutional, HEENT, cardiovascular, pulmonary, gi and gu systems are negative, except as otherwise noted.      Objective    /79   Pulse 64   Temp 98.4  F (36.9  C) (Temporal)   Resp 16   Ht 1.651 m (5' 5\")   Wt 82.6 kg (182 lb)   LMP 07/15/2022   SpO2 99%   BMI 30.29 kg/m    Body mass index is 30.29 kg/m .  Physical Exam   GENERAL: healthy, alert and no distress  RESP: lungs clear to auscultation - no rales, rhonchi or wheezes  CV: regular rate and rhythm, normal S1 S2, no S3 or S4, no murmur, click or rub,   ABDOMEN: soft, nontender, no hepatosplenomegaly, no masses and bowel sounds normal  MS: no gross musculoskeletal defects noted, no edema  PSYCH: mentation appears normal, affect normal/bright  SKIN:Reticulated pink-brown papules on the back  "

## 2022-08-02 NOTE — PATIENT INSTRUCTIONS
Darrius Nur,    Thank you for allowing [facility] to manage your care.    Use sun screen while you are  taking medication and use another form of birth control in addition to the ocp    I sent your prescriptions to your pharmacy.    I made a referral, they will be calling in approximately 1 week to set up your appointment.  If you do not hear from them, please call the specialty number on your after visit.     For your convenience, test results are released as soon as they are available  Please allow 1-2 business days for me to send you a comment about your results.  If not done so, I encourage you to login into [employer] ([email or web address] to review your results in real time.     If you have any questions or concerns, please feel free to call us at [phone].    Sincerely,    Dr. Sood    Did you know?      You can schedule a video visit for follow-up appointments as well as future appointments for certain conditions.  Please see the below link.     [email or web address]    If you have not already done so,  I encourage you to sign up for [employer] ([email or web address]).  This will allow you to review your results, securely communicate with a provider, and schedule virtual visits as well.

## 2022-09-04 ENCOUNTER — HEALTH MAINTENANCE LETTER (OUTPATIENT)
Age: 32
End: 2022-09-04

## 2023-01-11 ENCOUNTER — OFFICE VISIT (OUTPATIENT)
Dept: OBGYN | Facility: CLINIC | Age: 33
End: 2023-01-11
Payer: COMMERCIAL

## 2023-01-11 ENCOUNTER — E-VISIT (OUTPATIENT)
Dept: FAMILY MEDICINE | Facility: CLINIC | Age: 33
End: 2023-01-11
Payer: COMMERCIAL

## 2023-01-11 VITALS
BODY MASS INDEX: 31.4 KG/M2 | HEIGHT: 65 IN | WEIGHT: 188.5 LBS | DIASTOLIC BLOOD PRESSURE: 70 MMHG | SYSTOLIC BLOOD PRESSURE: 124 MMHG

## 2023-01-11 DIAGNOSIS — E66.811 CLASS 1 OBESITY DUE TO EXCESS CALORIES WITH SERIOUS COMORBIDITY AND BODY MASS INDEX (BMI) OF 31.0 TO 31.9 IN ADULT: ICD-10-CM

## 2023-01-11 DIAGNOSIS — E66.811 CLASS 1 OBESITY DUE TO EXCESS CALORIES WITH SERIOUS COMORBIDITY AND BODY MASS INDEX (BMI) OF 33.0 TO 33.9 IN ADULT: ICD-10-CM

## 2023-01-11 DIAGNOSIS — E66.09 CLASS 1 OBESITY DUE TO EXCESS CALORIES WITH SERIOUS COMORBIDITY AND BODY MASS INDEX (BMI) OF 33.0 TO 33.9 IN ADULT: ICD-10-CM

## 2023-01-11 DIAGNOSIS — R68.89 EPISODES OF DECREASED ATTENTIVENESS: Primary | ICD-10-CM

## 2023-01-11 DIAGNOSIS — N91.2 AMENORRHEA: Primary | ICD-10-CM

## 2023-01-11 DIAGNOSIS — E66.09 CLASS 1 OBESITY DUE TO EXCESS CALORIES WITH SERIOUS COMORBIDITY AND BODY MASS INDEX (BMI) OF 31.0 TO 31.9 IN ADULT: ICD-10-CM

## 2023-01-11 LAB
CLUE CELLS: ABNORMAL
ESTRADIOL SERPL-MCNC: 336 PG/ML
FSH SERPL IRP2-ACNC: 7.5 MIU/ML
HCG INTACT+B SERPL-ACNC: <1 MIU/ML
LH SERPL-ACNC: 72.6 MIU/ML
MIS SERPL-MCNC: 10.3 NG/ML (ref 0.58–8.1)
PROGEST SERPL-MCNC: 0.8 NG/ML
TRICHOMONAS, WET PREP: ABNORMAL
WBC'S/HIGH POWER FIELD, WET PREP: ABNORMAL
YEAST, WET PREP: ABNORMAL

## 2023-01-11 PROCEDURE — 82627 DEHYDROEPIANDROSTERONE: CPT | Performed by: FAMILY MEDICINE

## 2023-01-11 PROCEDURE — 99207 PR NON-BILLABLE SERV PER CHARTING: CPT | Performed by: STUDENT IN AN ORGANIZED HEALTH CARE EDUCATION/TRAINING PROGRAM

## 2023-01-11 PROCEDURE — 83002 ASSAY OF GONADOTROPIN (LH): CPT | Performed by: FAMILY MEDICINE

## 2023-01-11 PROCEDURE — 82670 ASSAY OF TOTAL ESTRADIOL: CPT | Performed by: FAMILY MEDICINE

## 2023-01-11 PROCEDURE — 84403 ASSAY OF TOTAL TESTOSTERONE: CPT | Performed by: FAMILY MEDICINE

## 2023-01-11 PROCEDURE — 84144 ASSAY OF PROGESTERONE: CPT | Performed by: FAMILY MEDICINE

## 2023-01-11 PROCEDURE — 99204 OFFICE O/P NEW MOD 45 MIN: CPT | Performed by: FAMILY MEDICINE

## 2023-01-11 PROCEDURE — 83001 ASSAY OF GONADOTROPIN (FSH): CPT | Performed by: FAMILY MEDICINE

## 2023-01-11 PROCEDURE — 83520 IMMUNOASSAY QUANT NOS NONAB: CPT | Performed by: FAMILY MEDICINE

## 2023-01-11 PROCEDURE — 84702 CHORIONIC GONADOTROPIN TEST: CPT | Performed by: FAMILY MEDICINE

## 2023-01-11 PROCEDURE — 87210 SMEAR WET MOUNT SALINE/INK: CPT | Performed by: FAMILY MEDICINE

## 2023-01-11 PROCEDURE — 36415 COLL VENOUS BLD VENIPUNCTURE: CPT | Performed by: FAMILY MEDICINE

## 2023-01-11 NOTE — PATIENT INSTRUCTIONS
Metformin is the drug     Myoinositol is the supplement for PCOS   Reduce sugar cravings     Gutsy gynecologist       Dr. Ivelisse Siu, DO    Obstetrics and Gynecology  [facility] [place] and [place]

## 2023-01-11 NOTE — PROGRESS NOTES
SUBJECTIVE:  Liudmila Collado is an 32 year old   woman who presents for   gynecology consult for amenorrhea x 4 months, hirsutism, pelvic us.      No LMP recorded. Periods are occurring monthly, but now skipped 4 months, lasting   4 days. Menarche @ age teenager, Dysmenorrhea:mild, occurring premenstrually and first 1-2 days of flow. Cyclic symptoms   include none. No intermenstrual bleeding,   spotting, or discharge.    Current contraception: condoms  History of abnormal Pap smear: No  Family history of uterine or ovarian cancer: No  History of abnormal mammogram: No  Family history of breast cancer: Yes: paternal aunts         Past Medical History:   Diagnosis Date     Fibroadenoma      Seasonal rhinitis           Family History   Problem Relation Age of Onset     Anxiety Disorder Mother      Mental Illness Mother      Asthma Mother      Obesity Mother      Hypertension Father      Lipids Father      Depression Father      Aneurysm Father         observing     Anxiety Disorder Father      Obesity Father      Diabetes Maternal Grandmother      Diabetes Maternal Aunt         x1     Diabetes Maternal Uncle         x2     Breast Cancer Paternal Aunt      Depression Sister      Anxiety Disorder Sister      Asthma Sister      Cancer - colorectal No family hx of        Past Surgical History:   Procedure Laterality Date     BREAST BIOPSY, CORE RT/LT Right 2013    fibroadenoma     DENTAL SURGERY      wisdom teeth removed       Current Outpatient Medications   Medication     albuterol (PROAIR HFA, PROVENTIL HFA, VENTOLIN HFA) 108 (90 BASE) MCG/ACT inhaler     escitalopram (LEXAPRO) 20 MG tablet     levocetirizine (XYZAL) 5 MG tablet     norgestim-eth estrad triphasic (ORTHO TRI-CYCLEN LO) 0.18/0.215/0.25 MG-25 MCG tablet     No current facility-administered medications for this visit.     Allergies   Allergen Reactions     Seasonal Allergies        Social History     Tobacco Use     Smoking status: Never      "Smokeless tobacco: Never   Substance Use Topics     Alcohol use: Yes     Comment: socially       Review Of Systems  Ears/Nose/Throat: negative  Respiratory: No shortness of breath, dyspnea on exertion, cough, or hemoptysis  Cardiovascular: negative  Gastrointestinal: negative  Genitourinary: See HPI   Constitutional, HEENT, cardiovascular, pulmonary, GI, , musculoskeletal, neuro, skin, endocrine and psych systems are negative, except as otherwise noted.    OBJECTIVE:  /70   Ht 1.651 m (5' 5\")   Wt 85.5 kg (188 lb 8 oz)   LMP  (LMP Unknown)   BMI 31.37 kg/m       General appearance: healthy, alert and no distress  Skin: Skin color, texture, turgor normal. No rashes or lesions.  Ears: negative  Nose/Sinuses: Nares normal. Septum midline. Mucosa normal. No drainage or sinus tenderness.  Oropharynx: Lips, mucosa, and tongue normal. Teeth and gums normal.  Neck: Neck supple. No adenopathy. Thyroid symmetric, normal size,, Carotids without bruits.  Lungs: negative, Percussion normal. Good diaphragmatic excursion. Lungs clear  Heart: negative, PMI normal. No lifts, heaves, or thrills. RRR. No murmurs, clicks gallops or rub  Breasts: deferred  Abdomen: Abdomen soft, non-tender. BS normal. No masses, organomegaly  GYN PELVIC: NEGATIVE, normal external genitalia,         ASSESSMENT:  Liudmila Collado is an 32 year old  woman who presents for   gynecology consult for amenorrhea x 4 months, hirsutism, weight gain.      PLAN:  Dx:  1)  Rule out PCOS:  Check PCOS labs and ultrasound, AMH, hcg   Her goals are diagnosis, working on weight loss, would also like to completely rule out pregnancy     Labs and us ordered today to diagnose PCOS  Discussed weight loss and referral to the weight management center   Rule out pregnancy today with labs.      Discussed Metformin and ban-inositol     Labs were reviewed in [facility]   Imaging was reviewed in Epic   Tests and documents were reviewed.   Discussion of management or " test interpretation       Dr. Ivelisse Siu,     Obstetrics and Gynecology  [facility] - [place] and [place]

## 2023-01-11 NOTE — PATIENT INSTRUCTIONS
Thank you for choosing us for your care. I think an in-clinic visit would be best next steps based on your symptoms. Please schedule a clinic appointment; you won t be charged for this eVisit.      You can schedule an appointment right here in [facility], or call [phone]

## 2023-01-11 NOTE — NURSING NOTE
"Chief Complaint   Patient presents with     Consult     No period for 3-4 months--taken multiple pregnancy tests--noticed more chin hairs--wonders about hormones--LMP unknown       Initial /70   Ht 1.651 m (5' 5\")   Wt 85.5 kg (188 lb 8 oz)   LMP  (LMP Unknown)   BMI 31.37 kg/m   Estimated body mass index is 31.37 kg/m  as calculated from the following:    Height as of this encounter: 1.651 m (5' 5\").    Weight as of this encounter: 85.5 kg (188 lb 8 oz).  BP completed using cuff size: regular    Questioned patient about current smoking habits.  Pt. has never smoked.      No obstetric history on file.    The following HM Due: NONE    "

## 2023-01-12 LAB — DHEA-S SERPL-MCNC: 388 UG/DL (ref 35–430)

## 2023-01-25 LAB — TESTOST SERPL-MCNC: 68 NG/DL (ref 8–60)

## 2023-05-30 ENCOUNTER — OFFICE VISIT (OUTPATIENT)
Dept: DERMATOLOGY | Facility: CLINIC | Age: 33
End: 2023-05-30
Attending: STUDENT IN AN ORGANIZED HEALTH CARE EDUCATION/TRAINING PROGRAM
Payer: COMMERCIAL

## 2023-05-30 DIAGNOSIS — B36.0 TINEA VERSICOLOR DUE TO MALASSEZIA FURFUR: Primary | ICD-10-CM

## 2023-05-30 PROCEDURE — 99213 OFFICE O/P EST LOW 20 MIN: CPT | Performed by: PHYSICIAN ASSISTANT

## 2023-05-30 RX ORDER — KETOCONAZOLE 20 MG/ML
SHAMPOO TOPICAL
Qty: 120 ML | Refills: 6 | Status: SHIPPED | OUTPATIENT
Start: 2023-05-30

## 2023-05-30 RX ORDER — LISDEXAMFETAMINE DIMESYLATE 30 MG/1
CAPSULE ORAL DAILY
COMMUNITY
Start: 2023-04-26 | End: 2023-06-05

## 2023-05-30 RX ORDER — FLUCONAZOLE 100 MG/1
100 TABLET ORAL DAILY
Qty: 14 TABLET | Refills: 0 | Status: SHIPPED | OUTPATIENT
Start: 2023-05-30 | End: 2023-06-13

## 2023-05-30 ASSESSMENT — PAIN SCALES - GENERAL: PAINLEVEL: NO PAIN (0)

## 2023-05-30 NOTE — LETTER
5/30/2023         RE: Liudmila Collado  [address]  [place] [address]        Dear Colleague,    Thank you for referring your patient, Liudmila Collado, to the [facility]. Please see a copy of my visit note below.    Liudmila Collado is an extremely pleasant 33 year old year old female patient here today for rash on chest and back. Present for months. She notes comes and goes. She notes she has tried otc antidandruff shampoo. She has also tried minocycline which she initially felt like helped but then returned and second course did not help. She also has rough bumps on arms, using cerave SA helps some.   Patient has no other skin complaints today.  Remainder of the HPI, Meds, PMH, Allergies, FH, and SH was reviewed in chart.    Past Medical History:   Diagnosis Date     Fibroadenoma      Seasonal rhinitis        Past Surgical History:   Procedure Laterality Date     BREAST BIOPSY, CORE RT/LT Right 2013    fibroadenoma     DENTAL SURGERY      wisdom teeth removed        Family History   Problem Relation Age of Onset     Anxiety Disorder Mother      Mental Illness Mother      Asthma Mother      Obesity Mother      Hypertension Father      Lipids Father      Depression Father      Aneurysm Father         observing     Anxiety Disorder Father      Obesity Father      Diabetes Maternal Grandmother      Diabetes Maternal Aunt         x1     Diabetes Maternal Uncle         x2     Breast Cancer Paternal Aunt      Depression Sister      Anxiety Disorder Sister      Asthma Sister      Cancer - colorectal No family hx of        Social History     Socioeconomic History     Marital status:      Spouse name: Not on file     Number of children: Not on file     Years of education: Not on file     Highest education level: Not on file   Occupational History     Not on file   Tobacco Use     Smoking status: Never     Smokeless tobacco: Never   Vaping Use     Vaping status: Not on file    Substance and Sexual Activity     Alcohol use: Yes     Comment: socially     Drug use: No     Sexual activity: Yes     Partners: Male     Birth control/protection: Condom, Pill   Other Topics Concern     Parent/sibling w/ CABG, MI or angioplasty before 65F 55M? No   Social History Narrative     Not on file     Social Determinants of Health     Financial Resource Strain: Not on file   Food Insecurity: Not on file   Transportation Needs: Not on file   Physical Activity: Not on file   Stress: Not on file   Social Connections: Not on file   Intimate Partner Violence: Not on file   Housing Stability: Not on file       Outpatient Encounter Medications as of 5/30/2023   Medication Sig Dispense Refill     albuterol (PROAIR HFA, PROVENTIL HFA, VENTOLIN HFA) 108 (90 BASE) MCG/ACT inhaler Inhale 2 puffs into the lungs every 6 hours as needed for shortness of breath / dyspnea or wheezing 1 Inhaler 2     escitalopram (LEXAPRO) 20 MG tablet Take 1 tablet (20 mg) by mouth daily for 90 days Keep follow up appt for further refills. 90 tablet 3     fluconazole (DIFLUCAN) 100 MG tablet Take 1 tablet (100 mg) by mouth daily for 14 days 14 tablet 0     ketoconazole (NIZORAL) 2 % external shampoo Leave on for 5 minutes then rinse. Use 1-2 times weekly. 120 mL 6     VYVANSE 30 MG capsule daily       levocetirizine (XYZAL) 5 MG tablet May increase to twice daily if inadequate control with once daily (Patient not taking: Reported on 1/11/2023) 60 tablet 1     norgestim-eth estrad triphasic (ORTHO TRI-CYCLEN LO) 0.18/0.215/0.25 MG-25 MCG tablet Take 1 tablet by mouth daily (Patient not taking: Reported on 1/11/2023) 90 tablet 3     No facility-administered encounter medications on file as of 5/30/2023.             O:   NAD, WDWN, Alert & Oriented, Mood & Affect wnl, Vitals stable   Here today alone   There were no vitals taken for this visit.   General appearance normal   Vitals stable   Alert, oriented and in no acute distress      Brown  patches on chest, back and neck   Pink scaly perifollicular papules on arms    Eyes: Conjunctivae/lids:Normal     ENT: Lips: normal    MSK:Normal    Pulm: Breathing Normal    Neuro/Psych: Orientation:Alert and Orientedx3 ; Mood/Affect:normal   A/P:  1. Tinea versicolor  Take fluconazole daily for 14 days.  Use ketoconazole shampoo, leave for 5 minutes then rinse, use 1-2 times weekly.    2. Keratosis pilaris  Recommend:   glytone exfoliating body lotion.         Again, thank you for allowing me to participate in the care of your patient.        Sincerely,        Holly Lee PA-C

## 2023-05-30 NOTE — PROGRESS NOTES
Liudmila Collado is an extremely pleasant 33 year old year old female patient here today for rash on chest and back. Present for months. She notes comes and goes. She notes she has tried otc antidandruff shampoo. She has also tried minocycline which she initially felt like helped but then returned and second course did not help. She also has rough bumps on arms, using cerave SA helps some.   Patient has no other skin complaints today.  Remainder of the HPI, Meds, PMH, Allergies, FH, and SH was reviewed in chart.    Past Medical History:   Diagnosis Date     Fibroadenoma      Seasonal rhinitis        Past Surgical History:   Procedure Laterality Date     BREAST BIOPSY, CORE RT/LT Right 2013    fibroadenoma     DENTAL SURGERY      wisdom teeth removed        Family History   Problem Relation Age of Onset     Anxiety Disorder Mother      Mental Illness Mother      Asthma Mother      Obesity Mother      Hypertension Father      Lipids Father      Depression Father      Aneurysm Father         observing     Anxiety Disorder Father      Obesity Father      Diabetes Maternal Grandmother      Diabetes Maternal Aunt         x1     Diabetes Maternal Uncle         x2     Breast Cancer Paternal Aunt      Depression Sister      Anxiety Disorder Sister      Asthma Sister      Cancer - colorectal No family hx of        Social History     Socioeconomic History     Marital status:      Spouse name: Not on file     Number of children: Not on file     Years of education: Not on file     Highest education level: Not on file   Occupational History     Not on file   Tobacco Use     Smoking status: Never     Smokeless tobacco: Never   Vaping Use     Vaping status: Not on file   Substance and Sexual Activity     Alcohol use: Yes     Comment: socially     Drug use: No     Sexual activity: Yes     Partners: Male     Birth control/protection: Condom, Pill   Other Topics Concern     Parent/sibling w/ CABG, MI or angioplasty before 65F  55M? No   Social History Narrative     Not on file     Social Determinants of Health     Financial Resource Strain: Not on file   Food Insecurity: Not on file   Transportation Needs: Not on file   Physical Activity: Not on file   Stress: Not on file   Social Connections: Not on file   Intimate Partner Violence: Not on file   Housing Stability: Not on file       Outpatient Encounter Medications as of 5/30/2023   Medication Sig Dispense Refill     albuterol (PROAIR HFA, PROVENTIL HFA, VENTOLIN HFA) 108 (90 BASE) MCG/ACT inhaler Inhale 2 puffs into the lungs every 6 hours as needed for shortness of breath / dyspnea or wheezing 1 Inhaler 2     escitalopram (LEXAPRO) 20 MG tablet Take 1 tablet (20 mg) by mouth daily for 90 days Keep follow up appt for further refills. 90 tablet 3     fluconazole (DIFLUCAN) 100 MG tablet Take 1 tablet (100 mg) by mouth daily for 14 days 14 tablet 0     ketoconazole (NIZORAL) 2 % external shampoo Leave on for 5 minutes then rinse. Use 1-2 times weekly. 120 mL 6     VYVANSE 30 MG capsule daily       levocetirizine (XYZAL) 5 MG tablet May increase to twice daily if inadequate control with once daily (Patient not taking: Reported on 1/11/2023) 60 tablet 1     norgestim-eth estrad triphasic (ORTHO TRI-CYCLEN LO) 0.18/0.215/0.25 MG-25 MCG tablet Take 1 tablet by mouth daily (Patient not taking: Reported on 1/11/2023) 90 tablet 3     No facility-administered encounter medications on file as of 5/30/2023.             O:   NAD, WDWN, Alert & Oriented, Mood & Affect wnl, Vitals stable   Here today alone   There were no vitals taken for this visit.   General appearance normal   Vitals stable   Alert, oriented and in no acute distress      Brown patches on chest, back and neck   Pink scaly perifollicular papules on arms    Eyes: Conjunctivae/lids:Normal     ENT: Lips: normal    MSK:Normal    Pulm: Breathing Normal    Neuro/Psych: Orientation:Alert and Orientedx3 ; Mood/Affect:normal   A/P:  1. Tinea  versicolor  Take fluconazole daily for 14 days.  Use ketoconazole shampoo, leave for 5 minutes then rinse, use 1-2 times weekly.    2. Keratosis pilaris  Recommend:   glytone exfoliating body lotion.

## 2023-05-30 NOTE — PATIENT INSTRUCTIONS
Take fluconazole daily for 14 days.  Use ketoconazole shampoo, leave for 5 minutes then rinse, use 1-2 times weekly.    Glytone exfoliating body lotion for keratosis pilaris.

## 2023-05-30 NOTE — NURSING NOTE
Chief Complaint   Patient presents with     Derm Problem     Dark spots on chest & Neck & Back        There were no vitals filed for this visit.  Wt Readings from Last 1 Encounters:   01/11/23 85.5 kg (188 lb 8 oz)       Christina Reza LPN .................5/30/2023

## 2023-06-05 ENCOUNTER — OFFICE VISIT (OUTPATIENT)
Dept: FAMILY MEDICINE | Facility: CLINIC | Age: 33
End: 2023-06-05
Payer: COMMERCIAL

## 2023-06-05 VITALS
HEART RATE: 80 BPM | TEMPERATURE: 97.4 F | WEIGHT: 169 LBS | HEIGHT: 65 IN | DIASTOLIC BLOOD PRESSURE: 72 MMHG | OXYGEN SATURATION: 99 % | SYSTOLIC BLOOD PRESSURE: 108 MMHG | BODY MASS INDEX: 28.16 KG/M2 | RESPIRATION RATE: 16 BRPM

## 2023-06-05 DIAGNOSIS — Z29.89 NEED FOR MALARIA PROPHYLAXIS: ICD-10-CM

## 2023-06-05 DIAGNOSIS — J30.1 SEASONAL ALLERGIC RHINITIS DUE TO POLLEN: ICD-10-CM

## 2023-06-05 DIAGNOSIS — Z71.84 TRAVEL ADVICE ENCOUNTER: Primary | ICD-10-CM

## 2023-06-05 PROCEDURE — 99214 OFFICE O/P EST MOD 30 MIN: CPT | Performed by: STUDENT IN AN ORGANIZED HEALTH CARE EDUCATION/TRAINING PROGRAM

## 2023-06-05 RX ORDER — OLOPATADINE HYDROCHLORIDE 2 MG/ML
1 SOLUTION/ DROPS OPHTHALMIC DAILY
Qty: 2.5 ML | Refills: 1 | Status: SHIPPED | OUTPATIENT
Start: 2023-06-05 | End: 2024-07-19

## 2023-06-05 RX ORDER — ATOVAQUONE AND PROGUANIL HYDROCHLORIDE 250; 100 MG/1; MG/1
1 TABLET, FILM COATED ORAL DAILY
Qty: 29 TABLET | Refills: 0 | Status: SHIPPED | OUTPATIENT
Start: 2023-06-13 | End: 2023-07-12

## 2023-06-05 RX ORDER — MOMETASONE FUROATE MONOHYDRATE 50 UG/1
2 SPRAY, METERED NASAL DAILY
Qty: 17 G | Refills: 1 | Status: SHIPPED | OUTPATIENT
Start: 2023-06-05

## 2023-06-05 RX ORDER — CETIRIZINE HYDROCHLORIDE 10 MG/1
10 TABLET ORAL DAILY
Qty: 90 TABLET | Refills: 1 | Status: SHIPPED | OUTPATIENT
Start: 2023-06-05

## 2023-06-05 RX ORDER — MONTELUKAST SODIUM 10 MG/1
10 TABLET ORAL AT BEDTIME
Qty: 90 TABLET | Refills: 3 | Status: SHIPPED | OUTPATIENT
Start: 2023-06-05

## 2023-06-05 RX ORDER — LISDEXAMFETAMINE DIMESYLATE 40 MG/1
CAPSULE ORAL
COMMUNITY
Start: 2023-05-30

## 2023-06-05 RX ORDER — AZITHROMYCIN 250 MG/1
1000 TABLET, FILM COATED ORAL DAILY
Qty: 4 TABLET | Refills: 0 | Status: SHIPPED | OUTPATIENT
Start: 2023-06-05 | End: 2023-06-06

## 2023-06-05 ASSESSMENT — PAIN SCALES - GENERAL: PAINLEVEL: NO PAIN (1)

## 2023-06-05 NOTE — PROGRESS NOTES
"  Assessment & Plan     Pleasant 33 year old presents for pre-travel consultation and discussion of seasonal allergies    Travel advice encounter  Traveling to [place] x3 weeks. Based on CDC website, would benefit form malaria prophylaxis, so malarone prescribed as below. We discussed that travelers' diarrhea will resolve on its own in most cases, but did send her with rx for antibiotics nonetheless. Encouraged to reach out to other travel clinics as well as we do not have the vaccinations she may benefit from.   - azithromycin (ZITHROMAX) 250 MG tablet  Dispense: 4 tablet; Refill: 0    Need for malaria prophylaxis  - atovaquone-proguanil (MALARONE) 250-100 MG tablet  Dispense: 29 tablet; Refill: 0    Seasonal allergic rhinitis due to pollen  Has significant seasonal allergic rhinosinusitis as well as itchy eyes. Has been doing claritin d and has short term relief from this. Will add on zyrtec for a short time as well as montelukast long term. She thinks she is using flonase, so will switch to mometasone to see if that provides more benefit for rhinosinusitis. Also given allergy eye drops.     Ultimately, she may need allergy shots, so a referral to Asthma/Allergy sent today.   - loratadine-pseudoePHEDrine (CLARITIN-D 24-HOUR)  MG 24 hr tablet  Dispense: 90 tablet; Refill: 1  - cetirizine (ZYRTEC) 10 MG tablet  Dispense: 90 tablet; Refill: 1  - mometasone (NASONEX) 50 MCG/ACT nasal spray  Dispense: 17 g; Refill: 1  - Adult Allergy/Asthma Referral  - olopatadine (PATADAY) 0.2 % ophthalmic solution  Dispense: 2.5 mL; Refill: 1  - montelukast (SINGULAIR) 10 MG tablet; Take 1 tablet (10 mg) by mouth At Bedtime      I spent a total of 34 minutes on the day of the visit.   Time spent by me doing chart review, history and exam, documentation and further activities per the note     BMI:   Estimated body mass index is 28.12 kg/m  as calculated from the following:    Height as of this encounter: 1.651 m (5' 5\").    Weight " "as of this encounter: 76.7 kg (169 lb).     Maggie Mallory MD  [facility]    Amparo Nur is a 33 year old, presenting for the following health issues:  Travel Clinic and Allergies        6/5/2023     7:24 AM   Additional Questions   Roomed by Renetta   Accompanied by Self     History of Present Illness       Reason for visit:  1. Traveling to [place]. Need vaccines and discuss [place] belly. 2. Discuss allergy shots? Is this an option? My allergies have been OUT of control thus year.    She eats 0-1 servings of fruits and vegetables daily.She consumes 1 sweetened beverage(s) daily.She exercises with enough effort to increase her heart rate 10 to 19 minutes per day.  She exercises with enough effort to increase her heart rate 3 or less days per week. She is missing 1 dose(s) of medications per week.  She is not taking prescribed medications regularly due to remembering to take.     travelilng 15th through 5th to [facility].     Taken claritin, claritin d  Always struggle with allergies during this time (sprin/summer, sometimes fall)    Was oin [place]4 months, went through allergies then and not again    Benadryl at night not super helpful.   claritin d not helpful  Nasal spray not helping.     Sinus pressure/congestion, ear congestion.   When lay down, feels like she wheeezing.   Can' breath through nose.     Also allergic to cats. Haven' néstor any itching. Though, so it is all more sinus/season related right now.   A lot of sneezing.     Nasal spray: maybe fluticasone.      Review of Systems   Constitutional, HEENT, cardiovascular, pulmonary, GI, , musculoskeletal, neuro, skin, endocrine and psych systems are negative, except as otherwise noted.      Objective    /72 (BP Location: Right arm, Patient Position: Sitting, Cuff Size: Adult Regular)   Pulse 80   Temp 97.4  F (36.3  C) (Tympanic)   Resp 16   Ht 1.651 m (5' 5\")   Wt 76.7 kg (169 lb)   LMP 05/15/2023   SpO2 99%  "  BMI 28.12 kg/m    Body mass index is 28.12 kg/m .  Physical Exam  Constitutional:       Appearance: Normal appearance.   HENT:      Head: Normocephalic.      Right Ear: Ear canal normal. A middle ear effusion is present.      Left Ear: Ear canal normal. A middle ear effusion is present.      Nose: Congestion and rhinorrhea present.      Mouth/Throat:      Mouth: Mucous membranes are moist.      Pharynx: No posterior oropharyngeal erythema.   Eyes:      General: No scleral icterus.     Extraocular Movements: Extraocular movements intact.      Conjunctiva/sclera: Conjunctivae normal.   Cardiovascular:      Rate and Rhythm: Normal rate and regular rhythm.   Pulmonary:      Effort: Pulmonary effort is normal.      Breath sounds: Normal breath sounds. No wheezing.   Musculoskeletal:         General: Normal range of motion.      Cervical back: Normal range of motion.   Skin:     General: Skin is warm and dry.   Neurological:      General: No focal deficit present.      Mental Status: She is alert and oriented to person, place, and time.

## 2023-06-05 NOTE — PATIENT INSTRUCTIONS
https://wwwnc.cdc.gov/travel/destinations/traveler/none/[place]    [email or web address]/[email or web address]

## 2023-12-05 ENCOUNTER — TELEPHONE (OUTPATIENT)
Dept: FAMILY MEDICINE | Facility: CLINIC | Age: 33
End: 2023-12-05

## 2023-12-05 NOTE — TELEPHONE ENCOUNTER
Patient Quality Outreach    Patient is due for the following:   Cervical Cancer Screening - PAP Needed  Physical Preventive Adult Physical      Topic Date Due    Flu Vaccine (1) 09/01/2023    COVID-19 Vaccine (5 - 2023-24 season) 09/01/2023         Type of outreach:    Sent ALENTY message.      Questions for provider review:    None           Tala Baker MA  Chart routed to Care Team.

## 2023-12-05 NOTE — LETTER
January 8, 2024    To  Liudmila Collado  [address] [place] [address]    Your team at [facility] cares about your health. We have reviewed your chart and based on our findings; we are making the following recommendations to better manage your health.   We see you have not read your glob[employer] messages.   You are in particular need of attention regarding the following:     Schedule a primary care office visit with your provider for a Pap Smear to screen for Cervical Cancer.  Please schedule a Nurse Only Appointment with your primary care clinic to update your immunizations that are due.  PREVENTATIVE VISIT: Physical    If you have already completed these items, please contact the clinic via phone or   [employer] so your care team can review and update your records. Thank you for   choosing [facility] for your healthcare needs. For any questions,   concerns, or to schedule an appointment please contact our clinic.    Healthy Regards,      Your [facility] Care Team

## 2023-12-10 ENCOUNTER — HEALTH MAINTENANCE LETTER (OUTPATIENT)
Age: 33
End: 2023-12-10

## 2024-01-08 NOTE — TELEPHONE ENCOUNTER
Patient Quality Outreach    Type of outreach:    Sent letter.    Next Steps:  Reach out within 90 days via Phone, MyChart, and Letter.    Max number of attempts reached: No. Will try again in 90 days if patient still on fail list.        Tala Baker MA  Chart routed to Care Team.

## 2024-07-18 DIAGNOSIS — J30.1 SEASONAL ALLERGIC RHINITIS DUE TO POLLEN: ICD-10-CM

## 2024-07-19 RX ORDER — OLOPATADINE HYDROCHLORIDE 2 MG/ML
SOLUTION/ DROPS OPHTHALMIC
Qty: 2.5 ML | Refills: 0 | Status: SHIPPED | OUTPATIENT
Start: 2024-07-19

## 2024-07-19 NOTE — TELEPHONE ENCOUNTER
Requested Prescriptions   Pending Prescriptions Disp Refills    olopatadine (PATADAY) 0.2 % ophthalmic solution [Pharmacy Med Name: Olopatadine HCl Ophthalmic Solution 0.2 %] 2.5 mL 0     Sig: INSTILL ONE DROP INTO EACH EYE ONCE DAILY       Miscellaneous Opthalmic Allergy Drops Protocol Failed - 7/18/2024 12:50 PM        Failed - Recent (12 mo) or future (30 days) visit within the authorizing provider's specialty     The patient must have completed an in-person or virtual visit within the past 12 months or has a future visit scheduled within the next 90 days with the authorizing provider s specialty.  Urgent care and e-visits do not quality as an office visit for this protocol.          Passed - Patient is age 4 or older        Passed - Medication is active on med list        Passed - Patient is not pregnant        Passed - No positive pregnancy test on record in past 12 mos                 Stevo Ariza RN 07/19/24 10:29 AM

## 2025-06-22 ENCOUNTER — HEALTH MAINTENANCE LETTER (OUTPATIENT)
Age: 35
End: 2025-06-22